# Patient Record
Sex: FEMALE | Race: WHITE | NOT HISPANIC OR LATINO | Employment: OTHER | URBAN - METROPOLITAN AREA
[De-identification: names, ages, dates, MRNs, and addresses within clinical notes are randomized per-mention and may not be internally consistent; named-entity substitution may affect disease eponyms.]

---

## 2019-11-02 ENCOUNTER — APPOINTMENT (OUTPATIENT)
Dept: GENERAL RADIOLOGY | Facility: HOSPITAL | Age: 84
End: 2019-11-02

## 2019-11-02 ENCOUNTER — HOSPITAL ENCOUNTER (OUTPATIENT)
Facility: HOSPITAL | Age: 84
Setting detail: OBSERVATION
Discharge: HOME | End: 2019-11-04
Attending: EMERGENCY MEDICINE | Admitting: INTERNAL MEDICINE

## 2019-11-02 DIAGNOSIS — R53.1 WEAKNESS: ICD-10-CM

## 2019-11-02 DIAGNOSIS — I48.91 ATRIAL FIBRILLATION WITH RAPID VENTRICULAR RESPONSE (HCC): Primary | ICD-10-CM

## 2019-11-02 LAB
ANION GAP SERPL CALCULATED.3IONS-SCNC: 15 MMOL/L (ref 5–15)
APTT PPP: 25.3 SECONDS (ref 24–31)
BASOPHILS # BLD AUTO: 0.1 10*3/MM3 (ref 0–0.2)
BASOPHILS NFR BLD AUTO: 0.9 % (ref 0–1.5)
BUN BLD-MCNC: 28 MG/DL (ref 8–23)
BUN/CREAT SERPL: 20 (ref 7–25)
CALCIUM SPEC-SCNC: 10 MG/DL (ref 8.6–10.5)
CHLORIDE SERPL-SCNC: 97 MMOL/L (ref 98–107)
CO2 SERPL-SCNC: 27 MMOL/L (ref 22–29)
CREAT BLD-MCNC: 1.4 MG/DL (ref 0.57–1)
DEPRECATED RDW RBC AUTO: 41.6 FL (ref 37–54)
EOSINOPHIL # BLD AUTO: 0.2 10*3/MM3 (ref 0–0.4)
EOSINOPHIL NFR BLD AUTO: 1.9 % (ref 0.3–6.2)
ERYTHROCYTE [DISTWIDTH] IN BLOOD BY AUTOMATED COUNT: 12.8 % (ref 12.3–15.4)
GFR SERPL CREATININE-BSD FRML MDRD: 36 ML/MIN/1.73
GLUCOSE BLD-MCNC: 134 MG/DL (ref 65–99)
HCT VFR BLD AUTO: 42.4 % (ref 34–46.6)
HGB BLD-MCNC: 14.7 G/DL (ref 12–15.9)
INR PPP: 1.02 (ref 0.9–1.1)
LYMPHOCYTES # BLD AUTO: 2.5 10*3/MM3 (ref 0.7–3.1)
LYMPHOCYTES NFR BLD AUTO: 25.9 % (ref 19.6–45.3)
MCH RBC QN AUTO: 31.9 PG (ref 26.6–33)
MCHC RBC AUTO-ENTMCNC: 34.6 G/DL (ref 31.5–35.7)
MCV RBC AUTO: 92.2 FL (ref 79–97)
MONOCYTES # BLD AUTO: 0.6 10*3/MM3 (ref 0.1–0.9)
MONOCYTES NFR BLD AUTO: 6.3 % (ref 5–12)
NEUTROPHILS # BLD AUTO: 6.3 10*3/MM3 (ref 1.7–7)
NEUTROPHILS NFR BLD AUTO: 65 % (ref 42.7–76)
NRBC BLD AUTO-RTO: 0.1 /100 WBC (ref 0–0.2)
NT-PROBNP SERPL-MCNC: 1094 PG/ML (ref 5–1800)
NT-PROBNP SERPL-MCNC: 909.9 PG/ML (ref 5–1800)
PLATELET # BLD AUTO: 284 10*3/MM3 (ref 140–450)
PMV BLD AUTO: 7.8 FL (ref 6–12)
POTASSIUM BLD-SCNC: 4.2 MMOL/L (ref 3.5–5.2)
PROTHROMBIN TIME: 10.7 SECONDS (ref 9.6–11.7)
RBC # BLD AUTO: 4.6 10*6/MM3 (ref 3.77–5.28)
SODIUM BLD-SCNC: 139 MMOL/L (ref 136–145)
TROPONIN T SERPL-MCNC: <0.01 NG/ML (ref 0–0.03)
TROPONIN T SERPL-MCNC: <0.01 NG/ML (ref 0–0.03)
WBC NRBC COR # BLD: 9.7 10*3/MM3 (ref 3.4–10.8)

## 2019-11-02 PROCEDURE — 85025 COMPLETE CBC W/AUTO DIFF WBC: CPT | Performed by: EMERGENCY MEDICINE

## 2019-11-02 PROCEDURE — 83880 ASSAY OF NATRIURETIC PEPTIDE: CPT | Performed by: NURSE PRACTITIONER

## 2019-11-02 PROCEDURE — 80048 BASIC METABOLIC PNL TOTAL CA: CPT | Performed by: EMERGENCY MEDICINE

## 2019-11-02 PROCEDURE — 71045 X-RAY EXAM CHEST 1 VIEW: CPT

## 2019-11-02 PROCEDURE — 85610 PROTHROMBIN TIME: CPT | Performed by: EMERGENCY MEDICINE

## 2019-11-02 PROCEDURE — G0378 HOSPITAL OBSERVATION PER HR: HCPCS

## 2019-11-02 PROCEDURE — 99284 EMERGENCY DEPT VISIT MOD MDM: CPT

## 2019-11-02 PROCEDURE — 99219 PR INITIAL OBSERVATION CARE/DAY 50 MINUTES: CPT | Performed by: NURSE PRACTITIONER

## 2019-11-02 PROCEDURE — 96372 THER/PROPH/DIAG INJ SC/IM: CPT

## 2019-11-02 PROCEDURE — 96366 THER/PROPH/DIAG IV INF ADDON: CPT

## 2019-11-02 PROCEDURE — 93005 ELECTROCARDIOGRAM TRACING: CPT | Performed by: EMERGENCY MEDICINE

## 2019-11-02 PROCEDURE — 96365 THER/PROPH/DIAG IV INF INIT: CPT

## 2019-11-02 PROCEDURE — 84484 ASSAY OF TROPONIN QUANT: CPT | Performed by: EMERGENCY MEDICINE

## 2019-11-02 PROCEDURE — 25010000002 ENOXAPARIN PER 10 MG: Performed by: EMERGENCY MEDICINE

## 2019-11-02 PROCEDURE — 84484 ASSAY OF TROPONIN QUANT: CPT | Performed by: NURSE PRACTITIONER

## 2019-11-02 PROCEDURE — 83880 ASSAY OF NATRIURETIC PEPTIDE: CPT | Performed by: EMERGENCY MEDICINE

## 2019-11-02 PROCEDURE — 85730 THROMBOPLASTIN TIME PARTIAL: CPT | Performed by: EMERGENCY MEDICINE

## 2019-11-02 RX ORDER — ACETAMINOPHEN 325 MG/1
650 TABLET ORAL 2 TIMES DAILY
Status: DISCONTINUED | OUTPATIENT
Start: 2019-11-02 | End: 2019-11-04 | Stop reason: HOSPADM

## 2019-11-02 RX ORDER — LATANOPROST 50 UG/ML
1 SOLUTION/ DROPS OPHTHALMIC NIGHTLY
Status: DISCONTINUED | OUTPATIENT
Start: 2019-11-02 | End: 2019-11-04 | Stop reason: HOSPADM

## 2019-11-02 RX ORDER — MULTIPLE VITAMINS W/ MINERALS TAB 9MG-400MCG
1 TAB ORAL DAILY
COMMUNITY

## 2019-11-02 RX ORDER — HYDROMORPHONE HCL 110MG/55ML
1 PATIENT CONTROLLED ANALGESIA SYRINGE INTRAVENOUS ONCE
Status: DISCONTINUED | OUTPATIENT
Start: 2019-11-02 | End: 2019-11-02

## 2019-11-02 RX ORDER — PRAVASTATIN SODIUM 40 MG
40 TABLET ORAL DAILY
COMMUNITY

## 2019-11-02 RX ORDER — CETIRIZINE HYDROCHLORIDE 10 MG/1
10 TABLET ORAL DAILY
Status: DISCONTINUED | OUTPATIENT
Start: 2019-11-03 | End: 2019-11-04 | Stop reason: HOSPADM

## 2019-11-02 RX ORDER — SODIUM CHLORIDE 0.9 % (FLUSH) 0.9 %
10 SYRINGE (ML) INJECTION EVERY 12 HOURS SCHEDULED
Status: DISCONTINUED | OUTPATIENT
Start: 2019-11-02 | End: 2019-11-04 | Stop reason: HOSPADM

## 2019-11-02 RX ORDER — AMLODIPINE BESYLATE 5 MG/1
10 TABLET ORAL DAILY
Status: DISCONTINUED | OUTPATIENT
Start: 2019-11-03 | End: 2019-11-04 | Stop reason: HOSPADM

## 2019-11-02 RX ORDER — CETIRIZINE HYDROCHLORIDE 10 MG/1
1 TABLET ORAL NIGHTLY
COMMUNITY

## 2019-11-02 RX ORDER — TRAVOPROST OPHTHALMIC SOLUTION 0.04 MG/ML
1 SOLUTION OPHTHALMIC EVERY EVENING
COMMUNITY
End: 2020-01-10

## 2019-11-02 RX ORDER — ASPIRIN 81 MG/1
324 TABLET, CHEWABLE ORAL ONCE
Status: COMPLETED | OUTPATIENT
Start: 2019-11-02 | End: 2019-11-02

## 2019-11-02 RX ORDER — HYDROCORTISONE ACETATE 0.5 %
1 CREAM (GRAM) TOPICAL 2 TIMES DAILY
COMMUNITY
End: 2020-08-13

## 2019-11-02 RX ORDER — DILTIAZEM HCL IN NACL,ISO-OSM 125 MG/125
5-15 PLASTIC BAG, INJECTION (ML) INTRAVENOUS CONTINUOUS
Status: DISCONTINUED | OUTPATIENT
Start: 2019-11-02 | End: 2019-11-03

## 2019-11-02 RX ORDER — CALCIUM CARBONATE 200(500)MG
2 TABLET,CHEWABLE ORAL 3 TIMES DAILY PRN
Status: DISCONTINUED | OUTPATIENT
Start: 2019-11-02 | End: 2019-11-04 | Stop reason: HOSPADM

## 2019-11-02 RX ORDER — ASPIRIN 81 MG/1
81 TABLET ORAL DAILY
Status: DISCONTINUED | OUTPATIENT
Start: 2019-11-03 | End: 2019-11-04 | Stop reason: HOSPADM

## 2019-11-02 RX ORDER — AMLODIPINE BESYLATE 10 MG/1
1 TABLET ORAL DAILY
COMMUNITY
Start: 2017-03-03 | End: 2019-11-04 | Stop reason: HOSPADM

## 2019-11-02 RX ORDER — SODIUM CHLORIDE 0.9 % (FLUSH) 0.9 %
10 SYRINGE (ML) INJECTION AS NEEDED
Status: DISCONTINUED | OUTPATIENT
Start: 2019-11-02 | End: 2019-11-04 | Stop reason: HOSPADM

## 2019-11-02 RX ORDER — FAMOTIDINE 20 MG/1
20 TABLET, FILM COATED ORAL
Status: DISCONTINUED | OUTPATIENT
Start: 2019-11-03 | End: 2019-11-04 | Stop reason: HOSPADM

## 2019-11-02 RX ORDER — LISINOPRIL AND HYDROCHLOROTHIAZIDE 20; 12.5 MG/1; MG/1
2 TABLET ORAL DAILY
COMMUNITY

## 2019-11-02 RX ORDER — RANITIDINE 150 MG/1
150 TABLET ORAL 2 TIMES DAILY
COMMUNITY
End: 2020-08-13

## 2019-11-02 RX ORDER — ACETAMINOPHEN 325 MG/1
650 TABLET ORAL 2 TIMES DAILY
COMMUNITY

## 2019-11-02 RX ORDER — ATORVASTATIN CALCIUM 10 MG/1
10 TABLET, FILM COATED ORAL DAILY
Status: DISCONTINUED | OUTPATIENT
Start: 2019-11-03 | End: 2019-11-04 | Stop reason: HOSPADM

## 2019-11-02 RX ORDER — FUROSEMIDE 10 MG/ML
20 INJECTION INTRAMUSCULAR; INTRAVENOUS ONCE
Status: COMPLETED | OUTPATIENT
Start: 2019-11-02 | End: 2019-11-03

## 2019-11-02 RX ORDER — MULTIVITAMIN,THERAPEUTIC
1 TABLET ORAL DAILY
Status: DISCONTINUED | OUTPATIENT
Start: 2019-11-03 | End: 2019-11-04 | Stop reason: HOSPADM

## 2019-11-02 RX ADMIN — CALCIUM CARBONATE (ANTACID) CHEW TAB 500 MG 2 TABLET: 500 CHEW TAB at 20:27

## 2019-11-02 RX ADMIN — DILTIAZEM HYDROCHLORIDE 5 MG/HR: 5 INJECTION INTRAVENOUS at 16:47

## 2019-11-02 RX ADMIN — SODIUM CHLORIDE, PRESERVATIVE FREE 10 ML: 5 INJECTION INTRAVENOUS at 20:26

## 2019-11-02 RX ADMIN — LATANOPROST 1 DROP: 50 SOLUTION/ DROPS OPHTHALMIC at 22:29

## 2019-11-02 RX ADMIN — OYSTER SHELL CALCIUM WITH VITAMIN D 1 TABLET: 500; 200 TABLET, FILM COATED ORAL at 20:26

## 2019-11-02 RX ADMIN — ASPIRIN 81 MG 324 MG: 81 TABLET ORAL at 20:26

## 2019-11-02 RX ADMIN — ENOXAPARIN SODIUM 70 MG: 80 INJECTION SUBCUTANEOUS at 20:26

## 2019-11-02 RX ADMIN — METOPROLOL TARTRATE 25 MG: 25 TABLET ORAL at 20:27

## 2019-11-02 NOTE — ED PROVIDER NOTES
"Subjective   History of Present Illness  Weakness  84-year-old female complains of general weakness since this morning.  She denies chest or abdominal pain or vomiting.  States she did have some nausea.  Daughter states that she describes some near syncope earlier in the day.  She denies focal numbness or weakness or headache or blurry vision or speech difficulties.  Review of Systems   HENT: Negative.    Eyes: Negative.    Respiratory: Negative.    Cardiovascular: Negative.    Gastrointestinal: Positive for nausea. Negative for abdominal pain.   Endocrine: Negative.    Genitourinary: Negative.    Musculoskeletal: Negative.    Skin: Negative.    Neurological: Positive for weakness. Negative for headaches.   Hematological: Negative.    Psychiatric/Behavioral: Negative.        No past medical history on file.  Hypercholesterolemia, hypertension  No Known Allergies    No past surgical history on file.    No family history on file.    Social History     Socioeconomic History   • Marital status:      Spouse name: Not on file   • Number of children: Not on file   • Years of education: Not on file   • Highest education level: Not on file           Objective   Physical Exam    /64   Pulse 95   Temp 98.2 °F (36.8 °C)   Resp 18   Ht 160 cm (63\")   Wt 72 kg (158 lb 11.7 oz)   SpO2 90%   BMI 28.12 kg/m²     General: Well-developed elderly female, no acute distress, alert and appropriate  Eyes: Pupils round and reactive, sclera nonicteric  HEENT: Mucous membranes moist, no mucosal swelling  Neck: Supple, no nuchal rigidity, no lymphadenopathy  Respirations: Respirations nonlabored, equal breath sounds bilaterally, clear lungs  Heart irregular rhythm, rapid rate, no murmurs rubs or gallops,   Abdomen soft nontender nondistended, no hepatosplenomegaly, no hernia, no mass, normal bowel sounds, no CVA tenderness  Extremities no clubbing cyanosis or edema, calves are symmetric and nontender  Neuro cranial nerves " grossly intact, no focal limb deficits  Psych oriented, pleasant affect  Skin no rash, brisk cap refill  Procedures           ED Course      EKG shows atrial fibrillation, rate of 115    Results for orders placed or performed during the hospital encounter of 11/02/19   Basic Metabolic Panel   Result Value Ref Range    Glucose 134 (H) 65 - 99 mg/dL    BUN 28 (H) 8 - 23 mg/dL    Creatinine 1.40 (H) 0.57 - 1.00 mg/dL    Sodium 139 136 - 145 mmol/L    Potassium 4.2 3.5 - 5.2 mmol/L    Chloride 97 (L) 98 - 107 mmol/L    CO2 27.0 22.0 - 29.0 mmol/L    Calcium 10.0 8.6 - 10.5 mg/dL    eGFR Non African Amer 36 (L) >60 mL/min/1.73    BUN/Creatinine Ratio 20.0 7.0 - 25.0    Anion Gap 15.0 5.0 - 15.0 mmol/L   Protime-INR   Result Value Ref Range    Protime 10.7 9.6 - 11.7 Seconds    INR 1.02 0.90 - 1.10   aPTT   Result Value Ref Range    PTT 25.3 24.0 - 31.0 seconds   BNP   Result Value Ref Range    proBNP 909.9 5.0-1,800.0 pg/mL   Troponin   Result Value Ref Range    Troponin T <0.010 0.000 - 0.030 ng/mL   CBC Auto Differential   Result Value Ref Range    WBC 9.70 3.40 - 10.80 10*3/mm3    RBC 4.60 3.77 - 5.28 10*6/mm3    Hemoglobin 14.7 12.0 - 15.9 g/dL    Hematocrit 42.4 34.0 - 46.6 %    MCV 92.2 79.0 - 97.0 fL    MCH 31.9 26.6 - 33.0 pg    MCHC 34.6 31.5 - 35.7 g/dL    RDW 12.8 12.3 - 15.4 %    RDW-SD 41.6 37.0 - 54.0 fl    MPV 7.8 6.0 - 12.0 fL    Platelets 284 140 - 450 10*3/mm3    Neutrophil % 65.0 42.7 - 76.0 %    Lymphocyte % 25.9 19.6 - 45.3 %    Monocyte % 6.3 5.0 - 12.0 %    Eosinophil % 1.9 0.3 - 6.2 %    Basophil % 0.9 0.0 - 1.5 %    Neutrophils, Absolute 6.30 1.70 - 7.00 10*3/mm3    Lymphocytes, Absolute 2.50 0.70 - 3.10 10*3/mm3    Monocytes, Absolute 0.60 0.10 - 0.90 10*3/mm3    Eosinophils, Absolute 0.20 0.00 - 0.40 10*3/mm3    Basophils, Absolute 0.10 0.00 - 0.20 10*3/mm3    nRBC 0.1 0.0 - 0.2 /100 WBC     Xr Chest 1 View    Result Date: 11/2/2019  No acute cardiopulmonary process.  Electronically Signed  By-Felicitas Delacruz On:11/2/2019 5:32 PM This report was finalized on [id number] by  Felicitas Delacruz, .            MDM  Patient presents with general weakness and was found to have atrial fibrillation with rapid ventricular response.  She reports no chest pain.  She is afebrile.  She is in no respiratory distress.  She was started on Cardizem bolus and drip titrated to effect and her heart rate stabilized.  She was given aspirin and a dose of Lovenox.  Hospitalist service was paged and cardiology consulted.  Final diagnoses:   Atrial fibrillation with rapid ventricular response (CMS/HCC)   Weakness              Daniel Barron MD  11/02/19 9599

## 2019-11-03 LAB
BACTERIA UR QL AUTO: ABNORMAL /HPF
BILIRUB UR QL STRIP: NEGATIVE
CLARITY UR: CLEAR
COLOR UR: YELLOW
GLUCOSE UR STRIP-MCNC: NEGATIVE MG/DL
HGB UR QL STRIP.AUTO: NEGATIVE
HYALINE CASTS UR QL AUTO: ABNORMAL /LPF
KETONES UR QL STRIP: NEGATIVE
LEUKOCYTE ESTERASE UR QL STRIP.AUTO: ABNORMAL
NITRITE UR QL STRIP: NEGATIVE
PH UR STRIP.AUTO: 6.5 [PH] (ref 5–8)
PROT UR QL STRIP: NEGATIVE
RBC # UR: ABNORMAL /HPF
REF LAB TEST METHOD: ABNORMAL
SP GR UR STRIP: 1.02 (ref 1–1.03)
SQUAMOUS #/AREA URNS HPF: ABNORMAL /HPF
TROPONIN T SERPL-MCNC: <0.01 NG/ML (ref 0–0.03)
UROBILINOGEN UR QL STRIP: ABNORMAL
WBC UR QL AUTO: ABNORMAL /HPF

## 2019-11-03 PROCEDURE — 93005 ELECTROCARDIOGRAM TRACING: CPT | Performed by: INTERNAL MEDICINE

## 2019-11-03 PROCEDURE — 96375 TX/PRO/DX INJ NEW DRUG ADDON: CPT

## 2019-11-03 PROCEDURE — 99204 OFFICE O/P NEW MOD 45 MIN: CPT | Performed by: INTERNAL MEDICINE

## 2019-11-03 PROCEDURE — 96366 THER/PROPH/DIAG IV INF ADDON: CPT

## 2019-11-03 PROCEDURE — 99226 PR SBSQ OBSERVATION CARE/DAY 35 MINUTES: CPT | Performed by: HOSPITALIST

## 2019-11-03 PROCEDURE — G0378 HOSPITAL OBSERVATION PER HR: HCPCS

## 2019-11-03 PROCEDURE — 84484 ASSAY OF TROPONIN QUANT: CPT | Performed by: NURSE PRACTITIONER

## 2019-11-03 PROCEDURE — 81001 URINALYSIS AUTO W/SCOPE: CPT | Performed by: EMERGENCY MEDICINE

## 2019-11-03 PROCEDURE — 87086 URINE CULTURE/COLONY COUNT: CPT | Performed by: EMERGENCY MEDICINE

## 2019-11-03 PROCEDURE — 96361 HYDRATE IV INFUSION ADD-ON: CPT

## 2019-11-03 PROCEDURE — 25010000002 FUROSEMIDE PER 20 MG: Performed by: NURSE PRACTITIONER

## 2019-11-03 PROCEDURE — 93010 ELECTROCARDIOGRAM REPORT: CPT | Performed by: INTERNAL MEDICINE

## 2019-11-03 RX ORDER — DILTIAZEM HYDROCHLORIDE 240 MG/1
240 CAPSULE, COATED, EXTENDED RELEASE ORAL
Status: DISCONTINUED | OUTPATIENT
Start: 2019-11-03 | End: 2019-11-04 | Stop reason: HOSPADM

## 2019-11-03 RX ORDER — SODIUM CHLORIDE 9 MG/ML
100 INJECTION, SOLUTION INTRAVENOUS CONTINUOUS
Status: DISCONTINUED | OUTPATIENT
Start: 2019-11-03 | End: 2019-11-04 | Stop reason: HOSPADM

## 2019-11-03 RX ADMIN — OYSTER SHELL CALCIUM WITH VITAMIN D 1 TABLET: 500; 200 TABLET, FILM COATED ORAL at 21:08

## 2019-11-03 RX ADMIN — FAMOTIDINE 20 MG: 20 TABLET ORAL at 16:06

## 2019-11-03 RX ADMIN — ATORVASTATIN CALCIUM 10 MG: 10 TABLET, FILM COATED ORAL at 09:23

## 2019-11-03 RX ADMIN — AMLODIPINE BESYLATE 10 MG: 5 TABLET ORAL at 09:23

## 2019-11-03 RX ADMIN — APIXABAN 2.5 MG: 2.5 TABLET, FILM COATED ORAL at 21:08

## 2019-11-03 RX ADMIN — METOPROLOL TARTRATE 25 MG: 25 TABLET ORAL at 21:08

## 2019-11-03 RX ADMIN — LISINOPRIL: 20 TABLET ORAL at 11:06

## 2019-11-03 RX ADMIN — FUROSEMIDE 20 MG: 10 INJECTION, SOLUTION INTRAMUSCULAR; INTRAVENOUS at 00:16

## 2019-11-03 RX ADMIN — ASPIRIN 81 MG: 81 TABLET, COATED ORAL at 09:22

## 2019-11-03 RX ADMIN — DILTIAZEM HYDROCHLORIDE 240 MG: 240 CAPSULE, COATED, EXTENDED RELEASE ORAL at 14:41

## 2019-11-03 RX ADMIN — FAMOTIDINE 20 MG: 20 TABLET ORAL at 09:23

## 2019-11-03 RX ADMIN — LATANOPROST 1 DROP: 50 SOLUTION/ DROPS OPHTHALMIC at 21:08

## 2019-11-03 RX ADMIN — LISINOPRIL: 20 TABLET ORAL at 11:07

## 2019-11-03 RX ADMIN — OYSTER SHELL CALCIUM WITH VITAMIN D 1 TABLET: 500; 200 TABLET, FILM COATED ORAL at 09:22

## 2019-11-03 RX ADMIN — SODIUM CHLORIDE, PRESERVATIVE FREE 10 ML: 5 INJECTION INTRAVENOUS at 21:08

## 2019-11-03 RX ADMIN — METOPROLOL TARTRATE 25 MG: 25 TABLET ORAL at 09:23

## 2019-11-03 RX ADMIN — ACETAMINOPHEN 650 MG: 325 TABLET ORAL at 09:24

## 2019-11-03 RX ADMIN — SODIUM CHLORIDE 100 ML/HR: 900 INJECTION, SOLUTION INTRAVENOUS at 14:34

## 2019-11-03 RX ADMIN — THERA TABS 1 TABLET: TAB at 09:23

## 2019-11-03 RX ADMIN — CETIRIZINE HYDROCHLORIDE 10 MG: 10 TABLET, FILM COATED ORAL at 09:23

## 2019-11-03 RX ADMIN — SODIUM CHLORIDE, PRESERVATIVE FREE 10 ML: 5 INJECTION INTRAVENOUS at 09:24

## 2019-11-03 RX ADMIN — METOPROLOL TARTRATE 25 MG: 25 TABLET ORAL at 17:22

## 2019-11-03 NOTE — NURSING NOTE
Spoke with Dr York concerning patient complaint of chest tightness and difficulty breathing; orders received

## 2019-11-03 NOTE — PLAN OF CARE
Problem: Patient Care Overview  Goal: Plan of Care Review  Outcome: Ongoing (interventions implemented as appropriate)      Problem: Fall Risk (Adult)  Goal: Identify Related Risk Factors and Signs and Symptoms  Outcome: Ongoing (interventions implemented as appropriate)    Goal: Absence of Fall  Outcome: Ongoing (interventions implemented as appropriate)      Problem: Arrhythmia/Dysrhythmia (Symptomatic) (Adult)  Goal: Signs and Symptoms of Listed Potential Problems Will be Absent, Minimized or Managed (Arrhythmia/Dysrhythmia)  Outcome: Ongoing (interventions implemented as appropriate)

## 2019-11-03 NOTE — CONSULTS
[facility]    Rikki Woodward MD    CHIEF COMPLAINT: Atrial fibrillation    HISTORY OF PRESENT ILLNESS:    [place]ia is a very pleasant 84-year-old female patient with no known cardiac disease does have a past medical history significant for gastroesophageal reflux disease, with coronary artery risk factor significant for dyslipidemia and hypertension.  Is very active elderly female who gardens and does basically anything that she wants.  She is limited by nothing as per her family and her report.    Yesterday she began to feel little bit lightheaded and fatigued and felt like her heart was racing somewhat.  This culminated in a very severe malaise with a clammy feeling that caused her to come to the ER.  In the ER she was found to be in atrial fibrillation with a tachycardic response albeit within the and 15 to 120 b/min range.  She was having palpitations in the setting reportedly.    We were consulted for new onset atrial fibrillation.  She does take metoprolol tartrate 25 mg p.o. twice daily.  She was started on a Cardizem drip in the hospital with Lovenox for anticoagulation.  She is done well and has felt significantly better since.  She denies any nausea lightheadedness or dizziness.  She does report prior to admission that she has had episodes of postprandial chest pain that lasted approximately 30 minutes.  There is no associated nausea vomiting diaphoresis.    I personally reviewed her ECG tracing from admission atrial fibrillation with an average ventricular response of about 115 bpm with slight asymmetric ST-T wave depressions in the lateral precordial leads.  Cardiac enzymes were cycled and were determined to be negative on 3 occasions.  I also personally reviewed her chest x-ray from yesterday which showed no active or acute cardiopulmonary process.  She does have acute kidney injury with creatinine clearance of 36 and creatinine of 1.40 mg/dL (baseline  unknown).  In the setting of the above she did have an elevated proBNP from 1094 trending down to 909.  Again this is in the setting of acute kidney injury.      Past Medical History:   Diagnosis Date   • GERD (gastroesophageal reflux disease)    • Glaucoma    • Hypertension    • Osteoarthritis      History reviewed. No pertinent surgical history.  History reviewed. No pertinent family history.  Social History     Tobacco Use   • Smoking status: Never Smoker   • Smokeless tobacco: Never Used   Substance Use Topics   • Alcohol use: No     Frequency: Never   • Drug use: No     Medications Prior to Admission   Medication Sig Dispense Refill Last Dose   • acetaminophen (TYLENOL) 325 MG tablet Take 650 mg by mouth 2 (Two) Times a Day.   11/2/2019 at Unknown time   • amLODIPine (NORVASC) 10 MG tablet Take 1 tablet by mouth Daily.   11/2/2019 at Unknown time   • aspirin 81 MG tablet Take 1 tablet by mouth Daily.   11/2/2019 at Unknown time   • Calcium Carbonate-Vitamin D 600-200 MG-UNIT tablet Take 1 tablet by mouth 2 (Two) Times a Day.   11/2/2019 at Unknown time   • cetirizine (ZYRTEC ALLERGY) 10 MG tablet Take 1 tablet by mouth Daily.   11/2/2019 at Unknown time   • Glucosamine-Chondroit-Vit C-Mn (GLUCOSAMINE 1500 COMPLEX) capsule Take 1 capsule by mouth 2 (Two) Times a Day.   11/2/2019 at Unknown time   • Lifitegrast (XIIDRA) 5 % solution Apply 1 drop to eye(s) as directed by provider 2 (Two) Times a Day.   11/2/2019 at Unknown time   • lisinopril-hydrochlorothiazide (PRINZIDE,ZESTORETIC) 20-12.5 MG per tablet Take 2 tablets by mouth Daily.   11/2/2019 at Unknown time   • metoprolol tartrate (LOPRESSOR) 25 MG tablet Take 25 mg by mouth 2 (Two) Times a Day.   11/2/2019 at Unknown time   • Multiple Vitamins-Minerals (MULTIVITAMIN WITH MINERALS) tablet tablet Take 1 tablet by mouth Daily.   11/2/2019 at Unknown time   • pravastatin (PRAVACHOL) 40 MG tablet Take 40 mg by mouth Daily.   11/2/2019 at Unknown time   •  "raNITIdine (ZANTAC) 150 MG tablet Take 150 mg by mouth 2 (Two) Times a Day.   11/2/2019 at Unknown time   • travoprost, AV free, (TRAVATAN) 0.004 % solution ophthalmic solution Administer 1 drop to both eyes Every Evening. in affected eye(s)   11/1/2019 at Unknown time     Allergies:  Patient has no known allergies.    Review of Symptoms:  Constitutional: Patient afebrile no chills or unexpected weight changes  Respiratory: No cough, no wheezing or dyspnea  Cardiovascular: No chest pain, palpitations, dyspnea, orthopnea and no edema  Gastrointestinal: No nausea, vomiting, constipation or diarrhea.  No melena or dark stools    All other systems reviewed and are negative      Vital Signs  Temp:  [97.4 °F (36.3 °C)-98.2 °F (36.8 °C)] 97.4 °F (36.3 °C)  Heart Rate:  [] 74  Resp:  [14-18] 16  BP: ()/(41-89) 110/60  Oxygen Therapy  SpO2: 92 %  Pulse Oximetry Type: Continuous  Device (Oxygen Therapy): room air}  Body mass index is 27.1 kg/m².  Flowsheet Rows      First Filed Value   Admission Height  160 cm (63\") Documented at 11/02/2019 1614   Admission Weight  72 kg (158 lb 11.7 oz) Documented at 11/02/2019 1614        Physical exam  Constitutional: well-nourished, and appears stated age in no acute distress  PERRL: Conjunctiva clear, no pallor, anicteric  HENMT: normocephalic, normal dentition, no cyanosis or pallor  Neck:no bruits, or thrills and bilateral normal carotid upstroke. Normal jugular venous pressure  Cardiovascular: No parasternal heaves an non-displaced focal PMI.  Normal rate with irregularly irregular rhythm: no rub, gallop, murmur or click and normal S1 and S2; no lower or upper extremity edema.   Lungs: unlabored, no wheezing with no rales or rhonchi on auscultation.  Extremities: Warm, no clubbing, cyanosis. Full and equal peripheral pulses in extremities with no bruits appreciated.   Abdomen: soft, non-tender, non-distended  Musculoskeletal: no joint tenderness or swelling and no " erythema  Skin: Warm and dry, non-erythematous   Neuro:alert and normal affect. Oriented to time, place and person.          Results Review:    I reviewed the patient's new clinical results.  Lab Results (most recent)     Procedure Component Value Units Date/Time    Troponin [[id number]]  (Normal) Collected:  11/03/19 0138    Specimen:  Blood Updated:  11/03/19 0315     Troponin T <0.010 ng/mL     Narrative:       Troponin T Reference Range:  <= 0.03 ng/mL-   Negative for AMI  >0.03 ng/mL-     Abnormal for myocardial necrosis.  Clinicians would have to utilize clinical acumen, EKG, Troponin and serial changes to determine if it is an Acute Myocardial Infarction or myocardial injury due to an underlying chronic condition.     BNP [[id number]]  (Normal) Collected:  11/02/19 2017    Specimen:  Blood Updated:  11/02/19 2117     proBNP 1,094.0 pg/mL     Narrative:       Among patients with dyspnea, NT-proBNP is highly sensitive for the detection of acute congestive heart failure. In addition NT-proBNP of <300 pg/ml effectively rules out acute congestive heart failure with 99% negative predictive value.    Troponin [[id number]]  (Normal) Collected:  11/02/19 2017    Specimen:  Blood Updated:  11/02/19 2109     Troponin T <0.010 ng/mL      Comment: Specimen hemolyzed.  Results may be affected.       Narrative:       Troponin T Reference Range:  <= 0.03 ng/mL-   Negative for AMI  >0.03 ng/mL-     Abnormal for myocardial necrosis.  Clinicians would have to utilize clinical acumen, EKG, Troponin and serial changes to determine if it is an Acute Myocardial Infarction or myocardial injury due to an underlying chronic condition.     Basic Metabolic Panel [[id number]]  (Abnormal) Collected:  11/02/19 1642    Specimen:  Blood Updated:  11/02/19 1709     Glucose 134 mg/dL      BUN 28 mg/dL      Creatinine 1.40 mg/dL      Sodium 139 mmol/L      Potassium 4.2 mmol/L      Chloride 97 mmol/L      CO2 27.0 mmol/L      Calcium 10.0 mg/dL       eGFR Non African Amer 36 mL/min/1.73      BUN/Creatinine Ratio 20.0     Anion Gap 15.0 mmol/L     Narrative:       GFR Normal >60  Chronic Kidney Disease <60  Kidney Failure <15    BNP [[id number]]  (Normal) Collected:  11/02/19 1642    Specimen:  Blood Updated:  11/02/19 1707     proBNP 909.9 pg/mL     Narrative:       Among patients with dyspnea, NT-proBNP is highly sensitive for the detection of acute congestive heart failure. In addition NT-proBNP of <300 pg/ml effectively rules out acute congestive heart failure with 99% negative predictive value.    Protime-INR [[id number]]  (Normal) Collected:  11/02/19 1642    Specimen:  Blood Updated:  11/02/19 1702     Protime 10.7 Seconds      INR 1.02    aPTT [[id number]]  (Normal) Collected:  11/02/19 1642    Specimen:  Blood Updated:  11/02/19 1702     PTT 25.3 seconds     CBC & Differential [[id number]] Collected:  11/02/19 1642    Specimen:  Blood Updated:  11/02/19 1647    Narrative:       The following orders were created for panel order CBC & Differential.  Procedure                               Abnormality         Status                     ---------                               -----------         ------                     CBC Auto Differential[[id number]]        Normal              Final result                 Please view results for these tests on the individual orders.    CBC Auto Differential [[id number]]  (Normal) Collected:  11/02/19 1642    Specimen:  Blood Updated:  11/02/19 1647     WBC 9.70 10*3/mm3      RBC 4.60 10*6/mm3      Hemoglobin 14.7 g/dL      Hematocrit 42.4 %      MCV 92.2 fL      MCH 31.9 pg      MCHC 34.6 g/dL      RDW 12.8 %      RDW-SD 41.6 fl      MPV 7.8 fL      Platelets 284 10*3/mm3      Neutrophil % 65.0 %      Lymphocyte % 25.9 %      Monocyte % 6.3 %      Eosinophil % 1.9 %      Basophil % 0.9 %      Neutrophils, Absolute 6.30 10*3/mm3      Lymphocytes, Absolute 2.50 10*3/mm3      Monocytes, Absolute 0.60 10*3/mm3      Eosinophils,  Absolute 0.20 10*3/mm3      Basophils, Absolute 0.10 10*3/mm3      nRBC 0.1 /100 WBC           Imaging Results (Most Recent)     Procedure Component Value Units Date/Time    XR Chest 1 View [[id number]] Collected:  11/02/19 1732     Updated:  11/02/19 1743    Narrative:       Examination: XR CHEST 1 VW-     Date of Exam: 11/2/2019 5:23 PM     Indication: weakness.     Comparison: None available.     Technique: 1 view of the chest      Findings:  There are no airspace consolidations. No pleural effusions. No  pneumothorax. The heart size is normal. The pulmonary vasculature  appears within normal limits. No acute osseous abnormality identified.       Impression:       No acute cardiopulmonary process.     Electronically Signed By-Felicitas Delacruz On:11/2/2019 5:32 PM  This report was finalized on [id number] by  Felicitas Delacruz, .        reviewed    ECG/EMG Results (most recent)     Procedure Component Value Units Date/Time    ECG 12 Lead [[id number]] Collected:  11/02/19 1643     Updated:  11/03/19 0638    Narrative:       HEART RATE= 115  bpm  RR Interval= 522  ms  UT Interval=   ms  P Horizontal Axis=   deg  P Front Axis=   deg  QRSD Interval= 83  ms  QT Interval= 330  ms  QRS Axis= 62  deg  T Wave Axis= -29  deg  - ABNORMAL ECG -  Atrial fibrillation  No previous ECG available for comparison  Electronically Signed By: Daniel Barron ([facility]) 03-Nov-2019 06:37:49  Date and Time of Study: 2019-11-02 16:43:52        reviewed    Assessment/Plan   84-year-old female with new onset atrial fibrillation with somewhat rapid ventricular response in the low 120 bpm range.  She responded very well to dual AV leila agents with the addition of Cardizem drip.  She has no obvious etiology to the above, no active pulmonary disease no recent pulmonary or upper respiratory process, no known ischemic heart disease, no known structural heart disease, no audible murmurs suggesting valvular etiology on physical exam.  She does have long-standing  hypertension which may attribute to afterload induced elevated filling pressures and resultant atrial fibrillation.  She has no congestive heart failure on physical exam despite proBNP which is almost exclusively secondary to elevated filling pressures contributing to atrial fibrillation; this is also somewhat falsely elevated in the setting of acute kidney injury.    Atrial fibrillation:  -We will switch to p.o. Cardizem and continue dual AV leila agent in the form of Cardizem p.o. and metoprolol tartrate twice daily.  Will start NOAC as anticoagulation.  Tentative plan is to allow for at least 6 weeks of anticoagulation with dual AV leila agent to slow sign of ventricular conduction in hopes of spontaneous conversion.  -We will get an echocardiogram to rule out structural heart disease and valvular contribution to the above.    GERD (gastroesophageal reflux disease)   Glaucoma   Hypertension   Osteoarthritis   Acute Kidney Injury  -Initiate IV fluids as no evidence of acute congestive heart failure and unlikely that her echo show any significant structural heart disease.    I discussed the patients findings and my recommendations with patient and family.     Greater than 30 minutes total of face-to-face/floor  time was spent with the patient and family and nursing coordinating plan and patient management.  Of which counseling of patient with regard specifically to describing the etiologies and likely etiology for her for new onset atrial fibrillation and its treatment comprised 50% of this total time.        Ricky York MD  11/03/19  2:08 PM

## 2019-11-03 NOTE — NURSING NOTE
Hospitalist NP gave RN orders for a BNP in the AM while at bedside.  RN also told NP about patient and family complaints of patient's discoloration on her right thumb.

## 2019-11-03 NOTE — H&P
[facility] Hospital Medicine Services        Patient Name:  Marisol Snyder  YOB: 1935  MRN:  [id number]  Admit Date:  11/2/2019    Patient Care Team:  Rikki Woodward MD as PCP - General (Family Medicine)            History Present Illness     Chief Complaint   Patient presents with   • Weakness - Generalized       Ms. Snyder is a 84 y.o.  presents to [facility] complaining of general weakness since this morning. She denies dizziness, chest pain, dyspnea, fever, headache , visual problems of fever. Family at bedside report she almost passed out while shopping today. In ED she was foungd to be in atrial fibrillation with RVR . Cardizem bolus and drip were given with reduced rate. She denies any PMH of CAD,CHF or cardiac problems. She denies smoking. She reports a PMH of GERD , HTN, HLD glaucoma and osteoporosis . In ED troponin was 0.010 but BNP was 909. She has nor BLE edema and breath sounds are clear. Cr is 1.4.UA pending. Wbc normal , cxr no acute process and afebrile. She will be admitted for serial troponin, 1x dose IV Lasix given and she will be maintained on Cardizem drip as needed. Cardiology consulted. She also complains of a dusky appearance to her right thumb which she reports started today. It is warm and blanches and no tenderness or erythema noted.          History of Present Illness  Review of Systems   Constitution: Positive for diaphoresis and weakness.   HENT: Negative.    Eyes: Negative.    Cardiovascular: Negative.    Respiratory: Negative.    Endocrine: Negative.    Hematologic/Lymphatic: Negative.    Skin: Positive for color change.        Right thumb   Musculoskeletal: Negative.    Gastrointestinal: Negative.    Genitourinary: Negative.    Psychiatric/Behavioral: Negative.    Allergic/Immunologic: Negative.            Personal History     Past Medical History:   Diagnosis Date   • GERD (gastroesophageal reflux disease)    • Glaucoma    •  Hypertension    • Osteoarthritis      History reviewed. No pertinent surgical history.  History reviewed. No pertinent family history.  Social History     Tobacco Use   • Smoking status: Never Smoker   • Smokeless tobacco: Never Used   Substance Use Topics   • Alcohol use: No     Frequency: Never   • Drug use: No     Prior to Admission medications    Medication Sig Start Date End Date Taking? Authorizing Provider   acetaminophen (TYLENOL) 325 MG tablet Take 650 mg by mouth 2 (Two) Times a Day.   Yes Sola Saleh MD   amLODIPine (NORVASC) 10 MG tablet Take 1 tablet by mouth Daily. 3/3/17  Yes Sola Saleh MD   aspirin 81 MG tablet Take 1 tablet by mouth Daily.   Yes Sola Saleh MD   Calcium Carbonate-Vitamin D 600-200 MG-UNIT tablet Take 1 tablet by mouth 2 (Two) Times a Day.   Yes Sola Saleh MD   cetirizine (ZYRTEC ALLERGY) 10 MG tablet Take 1 tablet by mouth Daily.   Yes Sola Saleh MD   Glucosamine-Chondroit-Vit C-Mn (GLUCOSAMINE 1500 COMPLEX) capsule Take 1 capsule by mouth 2 (Two) Times a Day.   Yes Sola Saleh MD   Lifitegrast (XIIDRA) 5 % solution Apply 1 drop to eye(s) as directed by provider 2 (Two) Times a Day.   Yes Sola Saleh MD   lisinopril-hydrochlorothiazide (PRINZIDE,ZESTORETIC) 20-12.5 MG per tablet Take 2 tablets by mouth Daily.   Yes Sola Saleh MD   metoprolol tartrate (LOPRESSOR) 25 MG tablet Take 25 mg by mouth 2 (Two) Times a Day.   Yes Sola Saleh MD   Multiple Vitamins-Minerals (MULTIVITAMIN WITH MINERALS) tablet tablet Take 1 tablet by mouth Daily.   Yes Sola Saleh MD   pravastatin (PRAVACHOL) 40 MG tablet Take 40 mg by mouth Daily.   Yes Sola Saleh MD   raNITIdine (ZANTAC) 150 MG tablet Take 150 mg by mouth 2 (Two) Times a Day.   Yes Sola Saleh MD   travoprost, AV free, (TRAVATAN) 0.004 % solution ophthalmic solution Administer 1 drop to both eyes Every Evening. in  affected eye(s)   Yes ProviderSola MD   Hypromellose (GENTEAL MILD) 0.2 % solution   11/2/19  Sola Saleh MD   MULTIPLE VITAMINS-MINERALS ER PO Take  by mouth.  11/2/19  Sola Saleh MD   Pseudoephedrine-Acetaminophen  MG tablet Take 2 tablets by mouth Every 6 (Six) Hours.  11/2/19  Sola Saleh MD     Allergies:  No Known Allergies      Objective     Vital Signs  Temp:  [97.7 °F (36.5 °C)-98.2 °F (36.8 °C)] 97.7 °F (36.5 °C)  Heart Rate:  [] 106  Resp:  [16-18] 16  BP: (106-150)/(57-89) 127/70  SpO2:  [85 %-96 %] 85 %  on   ;   Device (Oxygen Therapy): room air  Body mass index is 27.69 kg/m².    Physical Exam   Constitutional: She is oriented to person, place, and time. She appears well-nourished.   HENT:   Head: Atraumatic.   Eyes: EOM are normal.   Neck: Normal range of motion. Neck supple.   Cardiovascular:   Atrial fib    Pulmonary/Chest: Effort normal and breath sounds normal.   Abdominal: Soft. Bowel sounds are normal.   Musculoskeletal: Normal range of motion.   Neurological: She is alert and oriented to person, place, and time.   Skin: Skin is warm and dry.   Right thumb slightly dusky, warm non tender    Psychiatric: She has a normal mood and affect. Her behavior is normal. Judgment and thought content normal.   Vitals reviewed.      Results Review:  I reviewed the patient's new clinical results.  I reviewed the patient's new imaging results and agree with the interpretation.  I reviewed the patient's other test results and agree with the interpretation  I personally viewed and interpreted the patient's EKG/Telemetry data  Discussed with ED provider.    Results from last 7 days   Lab Units 11/02/19  1642   WBC 10*3/mm3 9.70   HEMOGLOBIN g/dL 14.7   HEMATOCRIT % 42.4   PLATELETS 10*3/mm3 284     Results from last 7 days   Lab Units 11/02/19  1642   SODIUM mmol/L 139   POTASSIUM mmol/L 4.2   CHLORIDE mmol/L 97*   CO2 mmol/L 27.0   BUN mg/dL 28*   CREATININE  mg/dL 1.40*   GLUCOSE mg/dL 134*   CALCIUM mg/dL 10.0     Results from last 7 days   Lab Units 11/02/19  1642   SODIUM mmol/L 139   POTASSIUM mmol/L 4.2   CHLORIDE mmol/L 97*   CO2 mmol/L 27.0   BUN mg/dL 28*   CREATININE mg/dL 1.40*   CALCIUM mg/dL 10.0   GLUCOSE mg/dL 134*         Lab Results   Component Value Date    CALCIUM 10.0 11/02/2019     No results found for: HGBA1C  No results found for: CHOL, CHLPL, TRIG, HDL, LDL, LDLDIRECT  No results found for: LIPASE          Microbiology Results (last 10 days)     ** No results found for the last 240 hours. **          ECG/EMG Results (most recent)     Procedure Component Value Units Date/Time    ECG 12 Lead [[id number]] Collected:  11/02/19 1643     Updated:  11/02/19 1645    Narrative:       HEART RATE= 115  bpm  RR Interval= 522  ms  [place] Interval=   ms  P Horizontal Axis=   deg  P Front Axis=   deg  QRSD Interval= 83  ms  QT Interval= 330  ms  QRS Axis= 62  deg  T Wave Axis= -29  deg  - ABNORMAL ECG -  Atrial fibrillation  Electronically Signed By:   Date and Time of Study: 2019-11-02 16:43:52                    Xr Chest 1 View    Result Date: 11/2/2019  No acute cardiopulmonary process.  Electronically Signed By-Felicitas Delacruz On:11/2/2019 5:32 PM This report was finalized on [id number] by  Felicitas Delacruz, .        Estimated Creatinine Clearance: 28.2 mL/min (A) (by C-G formula based on SCr of 1.4 mg/dL (H)).      Assessment/Plan     Active Hospital Problems    Diagnosis POA   • Atrial fibrillation with rapid ventricular response (CMS/HCC) [I48.91] Yes       General weakness likely secondary to new onset atrial fib with RVR now rate better controlled on Cardizem, Lovenox - pharmacy to dose , serial troponin, Fall precautions cardiology consulted , 2 D echo in am     CHF ? New diagnosis -  no BLE edema no dyspnea breath sounds clear CXR negative - 1x 20 mg Lasix given in light of Cr - 2D echo and repeat BNP in am cardiology consulted     Acute renal injury Cr  1.4- no previous labs - no IVF due to BNP, monitor renal function try to avoid nephrotoxic drug although 1 x low dose Lasix given for BNP    HTN- controlled continue home Norvasc, Zestoretic, metoprolol     HLD- on statin     OA- on glucosamine chondrotin     GERD- on Zantac    Glaucoma - on home eye drops              · I discussed the patients findings and my recommendations with patient and family at bedside as well as patients RN.  ·     VTE Prophylaxis - Lovenox pharmacy to dose .      Code Status - Full code.       MERNA Morris  [facility] Hospitalist Team  11/02/19  10:25 PM

## 2019-11-03 NOTE — PLAN OF CARE
Problem: Patient Care Overview  Goal: Plan of Care Review  Outcome: Ongoing (interventions implemented as appropriate)    Goal: Individualization and Mutuality  Outcome: Ongoing (interventions implemented as appropriate)    Goal: Discharge Needs Assessment  Outcome: Ongoing (interventions implemented as appropriate)      Problem: Fall Risk (Adult)  Goal: Identify Related Risk Factors and Signs and Symptoms  Outcome: Ongoing (interventions implemented as appropriate)    Goal: Absence of Fall  Outcome: Ongoing (interventions implemented as appropriate)      Problem: Arrhythmia/Dysrhythmia (Symptomatic) (Adult)  Goal: Signs and Symptoms of Listed Potential Problems Will be Absent, Minimized or Managed (Arrhythmia/Dysrhythmia)  Outcome: Ongoing (interventions implemented as appropriate)

## 2019-11-04 ENCOUNTER — HOSPITAL ENCOUNTER (OUTPATIENT)
Dept: CARDIOLOGY | Facility: HOSPITAL | Age: 84
Setting detail: OBSERVATION
Discharge: HOME | End: 2019-11-04

## 2019-11-04 VITALS
OXYGEN SATURATION: 90 % | DIASTOLIC BLOOD PRESSURE: 54 MMHG | RESPIRATION RATE: 20 BRPM | BODY MASS INDEX: 27.11 KG/M2 | WEIGHT: 153 LBS | HEIGHT: 63 IN | HEART RATE: 76 BPM | TEMPERATURE: 97.4 F | SYSTOLIC BLOOD PRESSURE: 112 MMHG

## 2019-11-04 VITALS
DIASTOLIC BLOOD PRESSURE: 79 MMHG | HEART RATE: 112 BPM | HEIGHT: 63 IN | WEIGHT: 153 LBS | SYSTOLIC BLOOD PRESSURE: 130 MMHG | RESPIRATION RATE: 20 BRPM | BODY MASS INDEX: 27.11 KG/M2

## 2019-11-04 PROBLEM — I10 HYPERTENSION: Status: ACTIVE | Noted: 2019-11-04

## 2019-11-04 PROBLEM — K21.9 GERD (GASTROESOPHAGEAL REFLUX DISEASE): Status: ACTIVE | Noted: 2019-11-04

## 2019-11-04 LAB
ANION GAP SERPL CALCULATED.3IONS-SCNC: 8 MMOL/L (ref 5–15)
BACTERIA SPEC AEROBE CULT: ABNORMAL
BH CV ECHO MEAS - ACS: 1.7 CM
BH CV ECHO MEAS - AO MAX PG (FULL): 3.4 MMHG
BH CV ECHO MEAS - AO MAX PG: 8.4 MMHG
BH CV ECHO MEAS - AO MEAN PG (FULL): 3.3 MMHG
BH CV ECHO MEAS - AO MEAN PG: 5.6 MMHG
BH CV ECHO MEAS - AO ROOT AREA (BSA CORRECTED): 1.5
BH CV ECHO MEAS - AO ROOT AREA: 5.1 CM^2
BH CV ECHO MEAS - AO ROOT DIAM: 2.5 CM
BH CV ECHO MEAS - AO V2 MAX: 144.6 CM/SEC
BH CV ECHO MEAS - AO V2 MEAN: 116.1 CM/SEC
BH CV ECHO MEAS - AO V2 VTI: 28.9 CM
BH CV ECHO MEAS - ASC AORTA: 2.7 CM
BH CV ECHO MEAS - AVA(I,A): 2.2 CM^2
BH CV ECHO MEAS - AVA(I,D): 2.2 CM^2
BH CV ECHO MEAS - AVA(V,A): 2.2 CM^2
BH CV ECHO MEAS - AVA(V,D): 2.2 CM^2
BH CV ECHO MEAS - BSA(HAYCOCK): 1.8 M^2
BH CV ECHO MEAS - BSA: 1.7 M^2
BH CV ECHO MEAS - BZI_BMI: 26.9 KILOGRAMS/M^2
BH CV ECHO MEAS - BZI_METRIC_HEIGHT: 160 CM
BH CV ECHO MEAS - BZI_METRIC_WEIGHT: 68.9 KG
BH CV ECHO MEAS - EDV(CUBED): 40.3 ML
BH CV ECHO MEAS - EDV(MOD-SP2): 26.5 ML
BH CV ECHO MEAS - EDV(MOD-SP4): 30 ML
BH CV ECHO MEAS - EDV(TEICH): 48.4 ML
BH CV ECHO MEAS - EF(CUBED): 67.2 %
BH CV ECHO MEAS - EF(MOD-BP): 50 %
BH CV ECHO MEAS - EF(MOD-SP2): 52.4 %
BH CV ECHO MEAS - EF(MOD-SP4): 46.8 %
BH CV ECHO MEAS - EF(TEICH): 59.8 %
BH CV ECHO MEAS - ESV(CUBED): 13.2 ML
BH CV ECHO MEAS - ESV(MOD-SP2): 12.6 ML
BH CV ECHO MEAS - ESV(MOD-SP4): 15.9 ML
BH CV ECHO MEAS - ESV(TEICH): 19.5 ML
BH CV ECHO MEAS - FS: 31 %
BH CV ECHO MEAS - IVS/LVPW: 1
BH CV ECHO MEAS - IVSD: 1.3 CM
BH CV ECHO MEAS - LA DIMENSION(2D): 3.5 CM
BH CV ECHO MEAS - LV DIASTOLIC VOL/BSA (35-75): 17.4 ML/M^2
BH CV ECHO MEAS - LV MASS(C)D: 150.3 GRAMS
BH CV ECHO MEAS - LV MASS(C)DI: 87.3 GRAMS/M^2
BH CV ECHO MEAS - LV MAX PG: 5 MMHG
BH CV ECHO MEAS - LV MEAN PG: 2.3 MMHG
BH CV ECHO MEAS - LV SYSTOLIC VOL/BSA (12-30): 9.3 ML/M^2
BH CV ECHO MEAS - LV V1 MAX: 111.5 CM/SEC
BH CV ECHO MEAS - LV V1 MEAN: 70.2 CM/SEC
BH CV ECHO MEAS - LV V1 VTI: 22 CM
BH CV ECHO MEAS - LVIDD: 3.4 CM
BH CV ECHO MEAS - LVIDS: 2.4 CM
BH CV ECHO MEAS - LVOT AREA: 2.9 CM^2
BH CV ECHO MEAS - LVOT DIAM: 1.9 CM
BH CV ECHO MEAS - LVPWD: 1.3 CM
BH CV ECHO MEAS - MV MAX PG: 6.4 MMHG
BH CV ECHO MEAS - MV MEAN PG: 2.1 MMHG
BH CV ECHO MEAS - MV V2 MAX: 126.2 CM/SEC
BH CV ECHO MEAS - MV V2 MEAN: 62.3 CM/SEC
BH CV ECHO MEAS - MV V2 VTI: 23.3 CM
BH CV ECHO MEAS - MVA(VTI): 2.8 CM^2
BH CV ECHO MEAS - PA MAX PG: 4.3 MMHG
BH CV ECHO MEAS - PA MEAN PG: 2.3 MMHG
BH CV ECHO MEAS - PA V2 MAX: 104.2 CM/SEC
BH CV ECHO MEAS - PA V2 MEAN: 71.7 CM/SEC
BH CV ECHO MEAS - PA V2 VTI: 19.2 CM
BH CV ECHO MEAS - PI END-D VEL: 75 CM/SEC
BH CV ECHO MEAS - PI MAX PG: 9.5 MMHG
BH CV ECHO MEAS - PI MAX VEL: 154.4 CM/SEC
BH CV ECHO MEAS - RAP SYSTOLE: 3 MMHG
BH CV ECHO MEAS - RVDD: 3.6 CM
BH CV ECHO MEAS - RVOT AREA: 5 CM^2
BH CV ECHO MEAS - RVOT DIAM: 2.5 CM
BH CV ECHO MEAS - RVSP: 27.3 MMHG
BH CV ECHO MEAS - SI(AO): 85.7 ML/M^2
BH CV ECHO MEAS - SI(CUBED): 15.7 ML/M^2
BH CV ECHO MEAS - SI(LVOT): 37.3 ML/M^2
BH CV ECHO MEAS - SI(MOD-SP2): 8.1 ML/M^2
BH CV ECHO MEAS - SI(MOD-SP4): 8.1 ML/M^2
BH CV ECHO MEAS - SI(TEICH): 16.8 ML/M^2
BH CV ECHO MEAS - SV(AO): 147.5 ML
BH CV ECHO MEAS - SV(CUBED): 27.1 ML
BH CV ECHO MEAS - SV(LVOT): 64.2 ML
BH CV ECHO MEAS - SV(MOD-SP2): 13.9 ML
BH CV ECHO MEAS - SV(MOD-SP4): 14 ML
BH CV ECHO MEAS - SV(TEICH): 29 ML
BH CV ECHO MEAS - TR MAX VEL: 245.5 CM/SEC
BUN BLD-MCNC: 20 MG/DL (ref 8–23)
BUN/CREAT SERPL: 19 (ref 7–25)
CALCIUM SPEC-SCNC: 8.9 MG/DL (ref 8.6–10.5)
CHLORIDE SERPL-SCNC: 103 MMOL/L (ref 98–107)
CO2 SERPL-SCNC: 28 MMOL/L (ref 22–29)
CREAT BLD-MCNC: 1.05 MG/DL (ref 0.57–1)
DEPRECATED RDW RBC AUTO: 42 FL (ref 37–54)
ERYTHROCYTE [DISTWIDTH] IN BLOOD BY AUTOMATED COUNT: 13 % (ref 12.3–15.4)
GFR SERPL CREATININE-BSD FRML MDRD: 50 ML/MIN/1.73
GLUCOSE BLD-MCNC: 101 MG/DL (ref 65–99)
HCT VFR BLD AUTO: 36.7 % (ref 34–46.6)
HGB BLD-MCNC: 12.9 G/DL (ref 12–15.9)
MAGNESIUM SERPL-MCNC: 1.6 MG/DL (ref 1.6–2.4)
MCH RBC QN AUTO: 33 PG (ref 26.6–33)
MCHC RBC AUTO-ENTMCNC: 35.2 G/DL (ref 31.5–35.7)
MCV RBC AUTO: 93.8 FL (ref 79–97)
PLATELET # BLD AUTO: 198 10*3/MM3 (ref 140–450)
PMV BLD AUTO: 7.6 FL (ref 6–12)
POTASSIUM BLD-SCNC: 3.5 MMOL/L (ref 3.5–5.2)
RBC # BLD AUTO: 3.91 10*6/MM3 (ref 3.77–5.28)
SODIUM BLD-SCNC: 139 MMOL/L (ref 136–145)
WBC NRBC COR # BLD: 8.2 10*3/MM3 (ref 3.4–10.8)

## 2019-11-04 PROCEDURE — 93306 TTE W/DOPPLER COMPLETE: CPT | Performed by: INTERNAL MEDICINE

## 2019-11-04 PROCEDURE — 80048 BASIC METABOLIC PNL TOTAL CA: CPT | Performed by: HOSPITALIST

## 2019-11-04 PROCEDURE — 96361 HYDRATE IV INFUSION ADD-ON: CPT

## 2019-11-04 PROCEDURE — 99217 PR OBSERVATION CARE DISCHARGE MANAGEMENT: CPT | Performed by: INTERNAL MEDICINE

## 2019-11-04 PROCEDURE — 83735 ASSAY OF MAGNESIUM: CPT | Performed by: NURSE PRACTITIONER

## 2019-11-04 PROCEDURE — 85027 COMPLETE CBC AUTOMATED: CPT | Performed by: HOSPITALIST

## 2019-11-04 PROCEDURE — 93306 TTE W/DOPPLER COMPLETE: CPT

## 2019-11-04 PROCEDURE — 99213 OFFICE O/P EST LOW 20 MIN: CPT | Performed by: NURSE PRACTITIONER

## 2019-11-04 PROCEDURE — 25010000002 SULFUR HEXAFLUORIDE MICROSPH 60.7-25 MG RECONSTITUTED SUSPENSION: Performed by: INTERNAL MEDICINE

## 2019-11-04 PROCEDURE — G0378 HOSPITAL OBSERVATION PER HR: HCPCS

## 2019-11-04 RX ORDER — DILTIAZEM HYDROCHLORIDE 240 MG/1
240 CAPSULE, COATED, EXTENDED RELEASE ORAL
Qty: 30 CAPSULE | Refills: 0 | Status: SHIPPED | OUTPATIENT
Start: 2019-11-05

## 2019-11-04 RX ADMIN — ATORVASTATIN CALCIUM 10 MG: 10 TABLET, FILM COATED ORAL at 09:26

## 2019-11-04 RX ADMIN — FAMOTIDINE 20 MG: 20 TABLET ORAL at 09:26

## 2019-11-04 RX ADMIN — THERA TABS 1 TABLET: TAB at 09:27

## 2019-11-04 RX ADMIN — ASPIRIN 81 MG: 81 TABLET, COATED ORAL at 09:27

## 2019-11-04 RX ADMIN — DILTIAZEM HYDROCHLORIDE 240 MG: 240 CAPSULE, COATED, EXTENDED RELEASE ORAL at 09:27

## 2019-11-04 RX ADMIN — METOPROLOL TARTRATE 25 MG: 25 TABLET ORAL at 09:27

## 2019-11-04 RX ADMIN — AMLODIPINE BESYLATE 10 MG: 5 TABLET ORAL at 10:51

## 2019-11-04 RX ADMIN — CETIRIZINE HYDROCHLORIDE 10 MG: 10 TABLET, FILM COATED ORAL at 09:27

## 2019-11-04 RX ADMIN — LISINOPRIL: 20 TABLET ORAL at 10:51

## 2019-11-04 RX ADMIN — OYSTER SHELL CALCIUM WITH VITAMIN D 1 TABLET: 500; 200 TABLET, FILM COATED ORAL at 09:26

## 2019-11-04 RX ADMIN — SULFUR HEXAFLUORIDE 2 ML: KIT at 11:20

## 2019-11-04 RX ADMIN — APIXABAN 2.5 MG: 2.5 TABLET, FILM COATED ORAL at 09:27

## 2019-11-04 RX ADMIN — SODIUM CHLORIDE 100 ML/HR: 900 INJECTION, SOLUTION INTRAVENOUS at 11:57

## 2019-11-04 NOTE — DISCHARGE SUMMARY
Date of Admission: 11/2/2019    Date of Discharge:  11/4/2019    Length of stay:  LOS: 0 days     Admission Diagnosis:A fib RVR    Principal and Active Diagnosis During Hospital Stay:  New onset atrial fib with RVR  -rate controlled with Cardizem and metoprolol po  -Patient has been started on Eliquis for stroke prevention  -Echo to evaluate for structural heart disease pending and will be followed up on in cardiology office     General weakness likely secondary to above   -MI ruled out by serial troponins  -resolved      Acute renal injury  secondary to ACE inhibitor versus CKD secondary to hypertension  -resolved      Primary HTN, chronic and controlled   -continue home Norvasc, Zestoretic, metoprolol      HLD- on statin      OA- on glucosamine chondrotin      GERD- on Zantac     Glaucoma - on home eye drops          Hospital Course  Patient is a 84 y.o. female presented with patient's was found to have new onset atrial fibrillation with RVR.  Was controlled on Cardizem drip and then transition to oral Cardizem and beta-blocker.  Eliquis was started by cardiology.  Cardiology felt the patient could be discharged home with outpatient follow-up and they will evaluate in 6 weeks for possible cardioversion at that time.  Echo was done and will be followed up in the cardiologist office and she was felt stable for discharge.      Procedures Performed:as noted         Consults:   Consults     Date and Time Order Name Status Description    11/2/2019 0446 Cardiology (on-call MD unless specified)      11/2/2019 9517 Hospitalist (on-call MD unless specified) Completed           Pertinent Test Results:     Lab Results (last 72 hours)     Procedure Component Value Units Date/Time    Urine Culture - Urine, Urine, Clean Catch [[id number]]  (Normal) Collected:  11/03/19 2230    Specimen:  Urine, Clean Catch Updated:  11/04/19 1110     Urine Culture Growth present, too young to evaluate    Magnesium [[id number]]  (Normal)  Collected:  11/04/19 0318    Specimen:  Blood Updated:  11/04/19 0934     Magnesium 1.6 mg/dL     Basic Metabolic Panel [[id number]]  (Abnormal) Collected:  11/04/19 0318    Specimen:  Blood Updated:  11/04/19 0429     Glucose 101 mg/dL      BUN 20 mg/dL      Creatinine 1.05 mg/dL      Sodium 139 mmol/L      Potassium 3.5 mmol/L      Chloride 103 mmol/L      CO2 28.0 mmol/L      Calcium 8.9 mg/dL      eGFR Non African Amer 50 mL/min/1.73      BUN/Creatinine Ratio 19.0     Anion Gap 8.0 mmol/L     Narrative:       GFR Normal >60  Chronic Kidney Disease <60  Kidney Failure <15    CBC (No Diff) [[id number]]  (Normal) Collected:  11/04/19 0318    Specimen:  Blood Updated:  11/04/19 0359     WBC 8.20 10*3/mm3      RBC 3.91 10*6/mm3      Hemoglobin 12.9 g/dL      Hematocrit 36.7 %      MCV 93.8 fL      MCH 33.0 pg      MCHC 35.2 g/dL      RDW 13.0 %      RDW-SD 42.0 fl      MPV 7.6 fL      Platelets 198 10*3/mm3     Urinalysis With Culture If Indicated - Urine, Clean Catch [[id number]]  (Abnormal) Collected:  11/03/19 2230    Specimen:  Urine, Clean Catch Updated:  11/03/19 2346     Color, UA Yellow     Appearance, UA Clear     pH, UA 6.5     Specific Gravity, UA 1.018     Glucose, UA Negative     Ketones, UA Negative     Bilirubin, UA Negative     Blood, UA Negative     Protein, UA Negative     Leuk Esterase, UA Trace     Nitrite, UA Negative     Urobilinogen, UA 0.2 E.U./dL    Urinalysis, Microscopic Only - Urine, Clean Catch [[id number]]  (Abnormal) Collected:  11/03/19 2230    Specimen:  Urine, Clean Catch Updated:  11/03/19 2346     RBC, UA 0-2 /HPF      WBC, UA 6-12 /HPF      Bacteria, UA None Seen /HPF      Squamous Epithelial Cells, UA 0-2 /HPF      Hyaline Casts, UA 0-2 /LPF      Methodology Automated Microscopy    Troponin [[id number]]  (Normal) Collected:  11/03/19 0138    Specimen:  Blood Updated:  11/03/19 0315     Troponin T <0.010 ng/mL     Narrative:       Troponin T Reference Range:  <= 0.03 ng/mL-   Negative  for AMI  >0.03 ng/mL-     Abnormal for myocardial necrosis.  Clinicians would have to utilize clinical acumen, EKG, Troponin and serial changes to determine if it is an Acute Myocardial Infarction or myocardial injury due to an underlying chronic condition.     BNP [[id number]]  (Normal) Collected:  11/02/19 2017    Specimen:  Blood Updated:  11/02/19 2117     proBNP 1,094.0 pg/mL     Narrative:       Among patients with dyspnea, NT-proBNP is highly sensitive for the detection of acute congestive heart failure. In addition NT-proBNP of <300 pg/ml effectively rules out acute congestive heart failure with 99% negative predictive value.    Troponin [[id number]]  (Normal) Collected:  11/02/19 2017    Specimen:  Blood Updated:  11/02/19 2109     Troponin T <0.010 ng/mL      Comment: Specimen hemolyzed.  Results may be affected.       Narrative:       Troponin T Reference Range:  <= 0.03 ng/mL-   Negative for AMI  >0.03 ng/mL-     Abnormal for myocardial necrosis.  Clinicians would have to utilize clinical acumen, EKG, Troponin and serial changes to determine if it is an Acute Myocardial Infarction or myocardial injury due to an underlying chronic condition.     Basic Metabolic Panel [[id number]]  (Abnormal) Collected:  11/02/19 1642    Specimen:  Blood Updated:  11/02/19 1709     Glucose 134 mg/dL      BUN 28 mg/dL      Creatinine 1.40 mg/dL      Sodium 139 mmol/L      Potassium 4.2 mmol/L      Chloride 97 mmol/L      CO2 27.0 mmol/L      Calcium 10.0 mg/dL      eGFR Non African Amer 36 mL/min/1.73      BUN/Creatinine Ratio 20.0     Anion Gap 15.0 mmol/L     Narrative:       GFR Normal >60  Chronic Kidney Disease <60  Kidney Failure <15    Troponin [[id number]]  (Normal) Collected:  11/02/19 1642    Specimen:  Blood Updated:  11/02/19 1709     Troponin T <0.010 ng/mL     Narrative:       Troponin T Reference Range:  <= 0.03 ng/mL-   Negative for AMI  >0.03 ng/mL-     Abnormal for myocardial necrosis.  Clinicians would have  to utilize clinical acumen, EKG, Troponin and serial changes to determine if it is an Acute Myocardial Infarction or myocardial injury due to an underlying chronic condition.     BNP [[id number]]  (Normal) Collected:  11/02/19 1642    Specimen:  Blood Updated:  11/02/19 1707     proBNP 909.9 pg/mL     Narrative:       Among patients with dyspnea, NT-proBNP is highly sensitive for the detection of acute congestive heart failure. In addition NT-proBNP of <300 pg/ml effectively rules out acute congestive heart failure with 99% negative predictive value.    Protime-INR [[id number]]  (Normal) Collected:  11/02/19 1642    Specimen:  Blood Updated:  11/02/19 1702     Protime 10.7 Seconds      INR 1.02    aPTT [[id number]]  (Normal) Collected:  11/02/19 1642    Specimen:  Blood Updated:  11/02/19 1702     PTT 25.3 seconds     CBC & Differential [[id number]] Collected:  11/02/19 1642    Specimen:  Blood Updated:  11/02/19 1647    Narrative:       The following orders were created for panel order CBC & Differential.  Procedure                               Abnormality         Status                     ---------                               -----------         ------                     CBC Auto Differential[[id number]]        Normal              Final result                 Please view results for these tests on the individual orders.    CBC Auto Differential [[id number]]  (Normal) Collected:  11/02/19 1642    Specimen:  Blood Updated:  11/02/19 1647     WBC 9.70 10*3/mm3      RBC 4.60 10*6/mm3      Hemoglobin 14.7 g/dL      Hematocrit 42.4 %      MCV 92.2 fL      MCH 31.9 pg      MCHC 34.6 g/dL      RDW 12.8 %      RDW-SD 41.6 fl      MPV 7.8 fL      Platelets 284 10*3/mm3      Neutrophil % 65.0 %      Lymphocyte % 25.9 %      Monocyte % 6.3 %      Eosinophil % 1.9 %      Basophil % 0.9 %      Neutrophils, Absolute 6.30 10*3/mm3      Lymphocytes, Absolute 2.50 10*3/mm3      Monocytes, Absolute 0.60 10*3/mm3      Eosinophils,  Absolute 0.20 10*3/mm3      Basophils, Absolute 0.10 10*3/mm3      nRBC 0.1 /100 WBC              No results found for: BLOODCX     Urine Culture   Date Value Ref Range Status   11/03/2019 Growth present, too young to evaluate  Preliminary      No results found for: WOUNDCX   No results found for: RESPCX   No results found for: STOOLCX   No results found for: STOOLCXY   No results found for: MRSACX   No results found for: VRECX   No results found for: CRECX   No components found for: AFBSTAINCX   No results found for: AFBCX   No results found for: AFBCXBLD   No results found for: FUNGUSCX   No components found for: GMSSTAIN   No results found for: KOHPREP   No results found for: ANACX   No results found for: BODYFLDCX   No results found for: CSFCX   No results found for: CULTURE   No results found for: THROATCX   No results found for: THROATCXBS   No results found for: ICECX   No results found for: DICECX   No results found for: GCCX           Imaging Results (All)     Procedure Component Value Units Date/Time    XR Chest 1 View [[id number]] Collected:  11/02/19 1732     Updated:  11/02/19 1743    Narrative:       Examination: XR CHEST 1 VW-     Date of Exam: 11/2/2019 5:23 PM     Indication: weakness.     Comparison: None available.     Technique: 1 view of the chest      Findings:  There are no airspace consolidations. No pleural effusions. No  pneumothorax. The heart size is normal. The pulmonary vasculature  appears within normal limits. No acute osseous abnormality identified.       Impression:       No acute cardiopulmonary process.     Electronically Signed By-Felicitas Delacruz On:11/2/2019 5:32 PM  This report was finalized on [id number] by  Felicitas Delacruz, .            Condition on Discharge:  Stable     Vital Signs  Temp:  [97.3 °F (36.3 °C)-98.6 °F (37 °C)] 97.4 °F (36.3 °C)  Heart Rate:  [] 112  Resp:  [12-20] 20  BP: (107-130)/(47-79) 130/79    Physical Exam:  Physical Exam   Constitutional: No  distress.   Eyes: Pupils are equal, round, and reactive to light.   Cardiovascular:   Irregularly irregular but controlled   Pulmonary/Chest: Effort normal.   Abdominal: Soft.   Neurological: She is alert.   Skin: Skin is warm.       Discharge Disposition  Home or Self Care    Discharge Medications     Discharge Medications      New Medications      Instructions Start Date   apixaban 2.5 MG tablet tablet  Commonly known as:  ELIQUIS   2.5 mg, Oral, Every 12 Hours Scheduled      dilTIAZem  MG 24 hr capsule  Commonly known as:  CARDIZEM CD   240 mg, Oral, Every 24 Hours Scheduled   Start Date:  11/5/2019        Continue These Medications      Instructions Start Date   acetaminophen 325 MG tablet  Commonly known as:  TYLENOL   650 mg, Oral, 2 Times Daily      aspirin 81 MG tablet   1 tablet, Oral, Daily      Calcium Carbonate-Vitamin D 600-200 MG-UNIT tablet   1 tablet, Oral, 2 Times Daily      GLUCOSAMINE 1500 COMPLEX capsule   1 capsule, Oral, 2 Times Daily      lisinopril-hydrochlorothiazide 20-12.5 MG per tablet  Commonly known as:  PRINZIDE,ZESTORETIC   2 tablets, Oral, Daily      metoprolol tartrate 25 MG tablet  Commonly known as:  LOPRESSOR   25 mg, Oral, 2 Times Daily      multivitamin with minerals tablet tablet   1 tablet, Oral, Daily      pravastatin 40 MG tablet  Commonly known as:  PRAVACHOL   40 mg, Oral, Daily      raNITIdine 150 MG tablet  Commonly known as:  ZANTAC   150 mg, Oral, 2 Times Daily      travoprost (AV free) 0.004 % solution ophthalmic solution  Commonly known as:  TRAVATAN   1 drop, Both Eyes, Every Evening, in affected eye(s)       XIIDRA 5 % solution  Generic drug:  Lifitegrast   1 drop, Ophthalmic, 2 Times Daily      ZYRTEC ALLERGY 10 MG tablet  Generic drug:  cetirizine   1 tablet, Oral, Daily         Stop These Medications    amLODIPine 10 MG tablet  Commonly known as:  NORVASC            Discharge Diet:   Diet Instructions     Diet: Cardiac      Discharge Diet:  Cardiac           Activity at Discharge:   Activity Instructions     Activity as Tolerated            Follow-up Appointments  Future Appointments   Date Time Provider Department Center   12/27/2019 11:15 AM Ricky York MD [facility] None     Additional Instructions for the Follow-ups that You Need to Schedule     Discharge Follow-up with PCP   As directed       Currently Documented PCP:    Rikki Woodward MD    PCP Phone Number:    [phone]     Follow Up Details:  one week         Discharge Follow-up with Specified Provider: cardiology; 1 Week   As directed      To:  cardiology    Follow Up:  1 Week               Test Results Pending at Discharge   Order Current Status    Urine Culture - Urine, Urine, Clean Catch Preliminary result           Risk for Readmission (LACE) Score: 2 (11/4/2019  6:00 AM)          Monico Chavez DO  11/04/19  12:49 PM

## 2019-11-04 NOTE — PROGRESS NOTES
[facility]        LOS:  LOS: 0 days   Patient Name: Marisol Snyder  Age/Sex: 84 y.o. female  : 1935  MRN: [id number]    Day of Service: 19   Length of Stay: 0  Encounter Provider: MERNA Cheng  Place of Service: [facility] CARDIOLOGY  Patient Care Team:  Rikki Woodward MD as PCP - General (Family Medicine)    Subjective:     Chief Complaint:  F/U Afib    Subjective:   Patient denies CP or SOA this am.  Feels well.    Current Medications:   Scheduled Meds:  acetaminophen 650 mg Oral BID   amLODIPine 10 mg Oral Daily   apixaban 2.5 mg Oral Q12H   aspirin 81 mg Oral Daily   atorvastatin 10 mg Oral Daily   calcium-vitamin D 1 tablet Oral BID   cetirizine 10 mg Oral Daily   dilTIAZem  mg Oral Q24H   famotidine 20 mg Oral BID AC   GI cocktail  Oral Once   latanoprost 1 drop Both Eyes Nightly   lisinopril-hydroCHLOROthiazide (ZESTORTIC) 20-12.5 mg combo dose  Oral Q24H   metoprolol tartrate 25 mg Oral TID   sodium chloride 10 mL Intravenous Q12H   THERA 1 tablet Oral Daily     Continuous Infusions:  sodium chloride 100 mL/hr Last Rate: 100 mL/hr (19 09)       Allergies:  No Known Allergies    ROS    Objective:     Temp:  [97.3 °F (36.3 °C)-98.6 °F (37 °C)] 97.4 °F (36.3 °C)  Heart Rate:  [] 79  Resp:  [12-20] 18  BP: (107-121)/(47-66) 121/66     Intake/Output Summary (Last 24 hours) at 2019 0930  Last data filed at 2019 0929  Gross per 24 hour   Intake 250 ml   Output 550 ml   Net -300 ml     Body mass index is 27.1 kg/m².      19  16119  0644   Weight: 72 kg (158 lb 11.7 oz) 70.9 kg (156 lb 4.9 oz) 69.4 kg (152 lb 16 oz)         Physical Exam:  Neuro:  CV:  Resp:  GI:  Ext:  Tele: AAOx3, cooperative  Irregular S1S2, no murmur  Non-labored, CTA  BS+, abd soft  Pedal palp, trace non-pitting BLE edema  afib with CVR                                                   Lab Review:    Results from last 7 days   Lab Units 11/04/19  0318 11/02/19  1642   SODIUM mmol/L 139 139   POTASSIUM mmol/L 3.5 4.2   CHLORIDE mmol/L 103 97*   CO2 mmol/L 28.0 27.0   BUN mg/dL 20 28*   CREATININE mg/dL 1.05* 1.40*   GLUCOSE mg/dL 101* 134*   CALCIUM mg/dL 8.9 10.0     Results from last 7 days   Lab Units 11/03/19  0138 11/02/19 2017 11/02/19  1642   TROPONIN T ng/mL <0.010 <0.010 <0.010     Results from last 7 days   Lab Units 11/04/19  0318 11/02/19  1642   WBC 10*3/mm3 8.20 9.70   HEMOGLOBIN g/dL 12.9 14.7   HEMATOCRIT % 36.7 42.4   PLATELETS 10*3/mm3 198 284     Results from last 7 days   Lab Units 11/02/19  1642   INR  1.02   APTT seconds 25.3               Invalid input(s): LDLCALC  Results from last 7 days   Lab Units 11/02/19 2017 11/02/19  1642   PROBNP pg/mL 1,094.0 909.9           Recent Radiology:  Imaging Results (Most Recent)     Procedure Component Value Units Date/Time    XR Chest 1 View [[id number]] Collected:  11/02/19 1732     Updated:  11/02/19 1743    Narrative:       Examination: XR CHEST 1 VW-     Date of Exam: 11/2/2019 5:23 PM     Indication: weakness.     Comparison: None available.     Technique: 1 view of the chest      Findings:  There are no airspace consolidations. No pleural effusions. No  pneumothorax. The heart size is normal. The pulmonary vasculature  appears within normal limits. No acute osseous abnormality identified.       Impression:       No acute cardiopulmonary process.     Electronically Signed By-Felicitas Delacruz On:11/2/2019 5:32 PM  This report was finalized on [id number] by  Felicitas Delacruz, .          ECHOCARDIOGRAM:           I reviewed the patient's new clinical results.    EKG:      Assessment:       Atrial fibrillation with rapid ventricular response (CMS/HCC)    GERD (gastroesophageal reflux disease)    Hypertension    1) New Afib  - troponin negative x 3  - CVR on BB, CCB  - on eliquis for AC  - 2D echo pending    2) JIGAR  - Cr 1.4 --> 1.05 on IVFs    3)  HTN    4) HLD    5) GERD    Plan:   Tele shows rate controlled afib on metoprolol tartrate 25 mg PO tid and cardizem  mg PO daily without any further bradycardia.  On eliquis for AC.  Cr improved on IVFs.  2D echo is pending.  As d/w Dr. York, pt planned for outpatient eval possible cardioversion after 6 weeks anticoagulation if she remains in afib.      Addendum: As d/w Dr. York, may review 2D echo results as outpatient.  Ok for discharge home.  F/U with Dr. York on 12/27 at 11:15 am.      Mary Hawley, MERNA  11/04/19  9:30 AM

## 2019-11-04 NOTE — PLAN OF CARE
Problem: Patient Care Overview  Goal: Plan of Care Review  Outcome: Ongoing (interventions implemented as appropriate)      Problem: Fall Risk (Adult)  Goal: Identify Related Risk Factors and Signs and Symptoms  Outcome: Ongoing (interventions implemented as appropriate)      Problem: Arrhythmia/Dysrhythmia (Symptomatic) (Adult)  Goal: Signs and Symptoms of Listed Potential Problems Will be Absent, Minimized or Managed (Arrhythmia/Dysrhythmia)  Outcome: Ongoing (interventions implemented as appropriate)

## 2019-11-04 NOTE — PLAN OF CARE
Problem: Patient Care Overview  Goal: Plan of Care Review  Outcome: Ongoing (interventions implemented as appropriate)   11/04/19 1249   Coping/Psychosocial   Plan of Care Reviewed With patient   Plan of Care Review   Progress improving   OTHER   Outcome Summary Patient okay to discharge today, follow up in cardio office for results of echo     Goal: Interprofessional Rounds/Family Conf  Outcome: Ongoing (interventions implemented as appropriate)   11/04/19 1249   Interdisciplinary Rounds/Family Conf   Summary Discharge pending echo   Participants ;social work/services;pharmacy;nursing       Problem: Arrhythmia/Dysrhythmia (Symptomatic) (Adult)  Goal: Signs and Symptoms of Listed Potential Problems Will be Absent, Minimized or Managed (Arrhythmia/Dysrhythmia)  Outcome: Ongoing (interventions implemented as appropriate)   11/04/19 1249   Goal/Outcome Evaluation   Problems Assessed (Arrhythmia/Dysrhythmia) all   Problems Present (Dysrhythmia) none

## 2019-11-04 NOTE — PROGRESS NOTES
[facility] Medicine Services Daily Progress Note      Hospitalist Team  LOS 0 days      Patient Care Team:  Rikki Woodward MD as PCP - General (Family Medicine)        Chief Complaint / Subjective  Chief Complaint   Patient presents with   • Weakness - Generalized     11/3: The patient reports feeling better today.  She denies any palpitations, chest pain, shortness of breath, lightheadedness or near syncope.    Brief Synopsis of Hospital Course/HPI  Ms. Snyder is a 84 y.o.  presents to [facility] complaining of general weakness since the morning of admission. She denies dizziness, chest pain, dyspnea, fever, headache, visual problems or fever. Family at bedside at the time of admission reported that she almost passed out while shopping today. In ED she was foungd to be in atrial fibrillation with RVR. Cardizem bolus and drip were given with reduced rate. She denies any PMH of CAD,CHF or cardiac problems. She denies smoking. She reports a PMH of GERD , HTN, HLD glaucoma and osteoporosis. In ED troponin was 0.010 but BNP was 909. She has no BLE edema and breath sounds are clear. Cr is 1.4. Wbc normal.  Urinalysis clear.  Cxr no acute process and patient is afebrile afebrile. She will be admitted for serial troponins.  Patient received IV Lasix and was started on Cardizem. Cardiology consulted and plans to try to maintain a controlled heart rate with dual blockade with beta blockers and check calcium channel blockers and monitor her to see if she will convert back to sinus rhythm.        Review of systems   12 point review of systems was reviewed and was negative except as above.      History reviewed. No pertinent family history.    Past Medical History:   Diagnosis Date   • GERD (gastroesophageal reflux disease)    • Glaucoma    • Hypertension    • Osteoarthritis        Social History     Socioeconomic History   • Marital status:      Spouse name: Not on file   •  "Number of children: Not on file   • Years of education: Not on file   • Highest education level: Not on file   Tobacco Use   • Smoking status: Never Smoker   • Smokeless tobacco: Never Used   Substance and Sexual Activity   • Alcohol use: No     Frequency: Never   • Drug use: No   • Sexual activity: Defer           Objective      Vital Signs  Temp:  [97.4 °F (36.3 °C)-98.6 °F (37 °C)] 98.6 °F (37 °C)  Heart Rate:  [] 118  Resp:  [14-20] 16  BP: ()/(41-70) 107/58  Oxygen Therapy  SpO2: 96 %  Pulse Oximetry Type: Continuous  Device (Oxygen Therapy): room air  Flowsheet Rows      First Filed Value   Admission Height  160 cm (63\") Documented at 11/02/2019 1614   Admission Weight  72 kg (158 lb 11.7 oz) Documented at 11/02/2019 1614        Intake & Output (last 3 days)       11/01 0701 - 11/02 0700 11/02 0701 - 11/03 0700 11/03 0701 - 11/04 0700           Urine Unmeasured Occurrence  1 x         Lines, Drains & Airways    Active LDAs     Name:   Placement date:   Placement time:   Site:   Days:    Peripheral IV 11/02/19 1637 Left Wrist   11/02/19    1637    Wrist   1                  Physical Exam:   Well-developed well-nourished female in no acute distress sitting up in bed awake and alert; mucous membranes moist; sclerae anicteric;  lungs clear to auscultation bilaterally; CV regular rate and rhythm; abdomen soft nontender nondistended with active bowel sounds; extremities with no edema, cyanosis or calf tenderness; palpable pedal pulses bilaterally; no Byrne catheter.      Procedures:              Results Review:     I reviewed the patient's new clinical results.    Results from last 7 days   Lab Units 11/02/19  1642   WBC 10*3/mm3 9.70   HEMOGLOBIN g/dL 14.7   HEMATOCRIT % 42.4   PLATELETS 10*3/mm3 284     Results from last 7 days   Lab Units 11/02/19  1642   SODIUM mmol/L 139   POTASSIUM mmol/L 4.2   CHLORIDE mmol/L 97*   CO2 mmol/L 27.0   BUN mg/dL 28*   CREATININE mg/dL 1.40*   CALCIUM mg/dL 10.0 "   GLUCOSE mg/dL 134*         Lab Results   Component Value Date    CALCIUM 10.0 11/02/2019     No results found for: HGBA1C  Results from last 7 days   Lab Units 11/02/19  1642   INR  1.02               Microbiology Results (last 10 days)     ** No results found for the last 240 hours. **          ECG/EMG Results (most recent)     Procedure Component Value Units Date/Time    ECG 12 Lead [[id number]] Collected:  11/02/19 1643     Updated:  11/03/19 0638    Narrative:       HEART RATE= 115  bpm  RR Interval= 522  ms  FL Interval=   ms  P Horizontal Axis=   deg  P Front Axis=   deg  QRSD Interval= 83  ms  QT Interval= 330  ms  QRS Axis= 62  deg  T Wave Axis= -29  deg  - ABNORMAL ECG -  Atrial fibrillation  No previous ECG available for comparison  Electronically Signed By: Daniel Barron (Fox) 03-Nov-2019 06:37:49  Date and Time of Study: 2019-11-02 16:43:52    ECG 12 Lead [[id number]] Collected:  11/03/19 1544     Updated:  11/03/19 1547    Narrative:       HEART RATE= 90  bpm  RR Interval= 663  ms  FL Interval=   ms  P Horizontal Axis=   deg  P Front Axis=   deg  QRSD Interval= 85  ms  QT Interval= 321  ms  QRS Axis= 56  deg  T Wave Axis= -57  deg  - ABNORMAL ECG -  Atrial fibrillation  Electronically Signed By:   Date and Time of Study: 2019-11-03 15:44:56                    Xr Chest 1 View    Result Date: 11/2/2019  No acute cardiopulmonary process.  Electronically Signed By-Felicitas Delacruz On:11/2/2019 5:32 PM This report was finalized on [id number] by  Felicitas Delacruz .      Xrays, labs reviewed personally by physician.    Medication Review:   I have reviewed the patient's current medication list      Scheduled Meds    acetaminophen 650 mg Oral BID   amLODIPine 10 mg Oral Daily   apixaban 2.5 mg Oral Q12H   aspirin 81 mg Oral Daily   atorvastatin 10 mg Oral Daily   calcium-vitamin D 1 tablet Oral BID   cetirizine 10 mg Oral Daily   dilTIAZem  mg Oral Q24H   famotidine 20 mg Oral BID AC   GI cocktail  Oral Once    latanoprost 1 drop Both Eyes Nightly   lisinopril-hydroCHLOROthiazide (ZESTORTIC) 20-12.5 mg combo dose  Oral Q24H   metoprolol tartrate 25 mg Oral TID   sodium chloride 10 mL Intravenous Q12H   THERA 1 tablet Oral Daily       Meds Infusions    sodium chloride 100 mL/hr Last Rate: 100 mL/hr (11/03/19 1926)       Meds PRN  calcium carbonate  •  Insert peripheral IV **AND** sodium chloride  •  sodium chloride        Assessment / Plan    Active Hospital Problems    Diagnosis  POA   • Atrial fibrillation with rapid ventricular response (CMS/HCC) [I48.91]  Yes      Resolved Hospital Problems   No resolved problems to display.     New onset atrial fib with RVR  -rate controlled with Cardizem and metoprolol  -Patient has been started on Eliquis for stroke prevention  -Echo to evaluate for structural heart disease pending    General weakness likely secondary to above   -MI ruled out by serial troponins    Acute renal injury Cr 1.4 secondary to ACE inhibitor versus CKD secondary to hypertension  - monitor and if creatinine does not normalize then consider renal ultrasound and urine electrolytes  -Urinalysis is pending collection     Primary HTN, chronic and controlled   -continue home Norvasc, Zestoretic, metoprolol      HLD- on statin      OA- on glucosamine chondrotin      GERD- on Zantac     Glaucoma - on home eye drops    DVT prophylaxis -patient is on apixaban      Marie Patterson MD  11/03/19  8:18 PM

## 2019-11-06 ENCOUNTER — TELEPHONE (OUTPATIENT)
Dept: CARDIOLOGY | Facility: CLINIC | Age: 84
End: 2019-11-06

## 2019-11-06 NOTE — TELEPHONE ENCOUNTER
Mrs Snyder is requesting her echocardiogram results. Please call her daughter    Maxine @ [phone]

## 2020-01-10 ENCOUNTER — OFFICE VISIT (OUTPATIENT)
Dept: CARDIOLOGY | Facility: CLINIC | Age: 85
End: 2020-01-10

## 2020-01-10 VITALS
WEIGHT: 157 LBS | OXYGEN SATURATION: 95 % | SYSTOLIC BLOOD PRESSURE: 126 MMHG | HEIGHT: 63 IN | DIASTOLIC BLOOD PRESSURE: 80 MMHG | RESPIRATION RATE: 18 BRPM | BODY MASS INDEX: 27.82 KG/M2 | HEART RATE: 66 BPM

## 2020-01-10 DIAGNOSIS — I48.91 ATRIAL FIBRILLATION WITH RAPID VENTRICULAR RESPONSE (HCC): Primary | ICD-10-CM

## 2020-01-10 DIAGNOSIS — E78.2 MIXED HYPERLIPIDEMIA: ICD-10-CM

## 2020-01-10 DIAGNOSIS — K21.00 GASTROESOPHAGEAL REFLUX DISEASE WITH ESOPHAGITIS: ICD-10-CM

## 2020-01-10 DIAGNOSIS — I10 ESSENTIAL HYPERTENSION: ICD-10-CM

## 2020-01-10 PROCEDURE — 99214 OFFICE O/P EST MOD 30 MIN: CPT | Performed by: INTERNAL MEDICINE

## 2020-01-11 NOTE — PROGRESS NOTES
Subjective:     Encounter Date:01/10/2020      Patient ID: Marisol Snyder is a 84 y.o. female.    Chief Complaint:  Chief Complaint   Patient presents with   • Atrial Fibrillation     Hosp f/u   • Hypertension   • Hyperlipidemia       HPI:  I am seeing Virginia who is a pleasant 84-year-old female for new onset atrial fibrillation which is a new complaint to me.    She has past medical history significant for dyslipidemia, hypertension and recent diagnosis of atrial fibrillation with rapid ventricular response leading to hospitalization 11/4/2019.  She also has a past medical history significant for gastroesophageal reflux disease.    Today she returns in sinus rhythm with rate and rhythm control at 66 bpm.  I personally reviewed her ECG tracing from 11/3/2019 which showed atrial fibrillation at 115 bpm with otherwise normal ECG.  I also personally reviewed her echocardiogram from 11/4/2019 which showed normal LV systolic function with moderate pulmonic regurgitation mild tricuspid and mitral regurgitation.    She has no complaints from a cardiovascular standpoint.        The following portions of the patient's history were reviewed and updated as appropriate: allergies, current medications, past family history, past medical history, past social history, past surgical history and problem list.    Problem List:  Patient Active Problem List   Diagnosis   • Atrial fibrillation with rapid ventricular response (CMS/[facility])   • GERD (gastroesophageal reflux disease)   • Hypertension   • Hyperlipidemia       Past Medical History:  Past Medical History:   Diagnosis Date   • JIGAR (acute kidney injury) (CMS/HCC)    • GERD (gastroesophageal reflux disease)    • Glaucoma    • History of echocardiogram 11/04/2019    Moderate IA, Mild MR/TR, LA moderate-severely dilated, EF 61-65%   • Hyperlipidemia    • Hypertension    • Osteoarthritis        Past Surgical History:  History reviewed. No pertinent surgical history.    Social  "History:  Social History     Socioeconomic History   • Marital status:      Spouse name: Not on file   • Number of children: Not on file   • Years of education: Not on file   • Highest education level: Not on file   Tobacco Use   • Smoking status: Never Smoker   • Smokeless tobacco: Never Used   Substance and Sexual Activity   • Alcohol use: No     Frequency: Never   • Drug use: No   • Sexual activity: Defer       Allergies:  No Known Allergies      Review of Symptoms:  Constitutional: Patient afebrile no chills or unexpected weight changes  Respiratory: No cough, no wheezing or dyspnea  Cardiovascular: No chest pain, palpitations, dyspnea, orthopnea and no edema  Gastrointestinal: No nausea, vomiting, constipation or diarrhea.  No melena or dark stools    All other systems reviewed and are negative         Objective:         /80 (BP Location: Left arm, Patient Position: Sitting, Cuff Size: Large Adult)   Pulse 66   Resp 18   Ht 160 cm (63\")   Wt 71.2 kg (157 lb)   SpO2 95%   BMI 27.81 kg/m²     Physical exam  Constitutional: well-nourished, and appears stated age in no acute distress  PERRL: Conjunctiva clear, no pallor, anicteric  HENMT: normocephalic, normal dentition, no cyanosis or pallor  Neck:no bruits, or thrills and bilateral normal carotid upstroke. Normal jugular venous pressure  Cardiovascular: No parasternal heaves an non-displaced focal PMI. Normal rate and rhythm: no rub, gallop, murmur or click and normal S1 and S2; no lower or upper extremity edema.   Lungs: unlabored, no wheezing with no rales or rhonchi on auscultation.  Extremities: Warm, no clubbing, cyanosis. Full and equal peripheral pulses in extremities with no bruits appreciated.   Abdomen: soft, non-tender, non-distended  Musculoskeletal: no joint tenderness or swelling and no erythema  Skin: Warm and dry, non-erythematous   Neuro:alert and normal affect. Oriented to time, place and person.           In-Office " Procedure(s):  Procedures    ASCVD RIsk Score::  The ASCVD Risk score (Sharpsburgjoni SHAY Jr., et al., 2013) failed to calculate for the following reasons:    The 2013 ASCVD risk score is only valid for ages 40 to 79    Recent Radiology:  Imaging Results (Most Recent)     None          Lab Review:   No visits with results within 2 Month(s) from this visit.   Latest known visit with results is:   Admission on 11/02/2019, Discharged on 11/04/2019   Component Date Value   • Glucose 11/02/2019 134*   • BUN 11/02/2019 28*   • Creatinine 11/02/2019 1.40*   • Sodium 11/02/2019 139    • Potassium 11/02/2019 4.2    • Chloride 11/02/2019 97*   • CO2 11/02/2019 27.0    • Calcium 11/02/2019 10.0    • eGFR Non  Amer 11/02/2019 36*   • BUN/Creatinine Ratio 11/02/2019 20.0    • Anion Gap 11/02/2019 15.0    • Protime 11/02/2019 10.7    • INR 11/02/2019 1.02    • PTT 11/02/2019 25.3    • proBNP 11/02/2019 909.9    • Troponin T 11/02/2019 <0.010    • Color, UA 11/03/2019 Yellow    • Appearance, UA 11/03/2019 Clear    • pH, UA 11/03/2019 6.5    • Specific Gravity, UA 11/03/2019 1.018    • Glucose, UA 11/03/2019 Negative    • Ketones, UA 11/03/2019 Negative    • Bilirubin, UA 11/03/2019 Negative    • Blood, UA 11/03/2019 Negative    • Protein, UA 11/03/2019 Negative    • Leuk Esterase, UA 11/03/2019 Trace*   • Nitrite, UA 11/03/2019 Negative    • Urobilinogen, UA 11/03/2019 0.2 E.U./dL    • WBC 11/02/2019 9.70    • RBC 11/02/2019 4.60    • Hemoglobin 11/02/2019 14.7    • Hematocrit 11/02/2019 42.4    • MCV 11/02/2019 92.2    • MCH 11/02/2019 31.9    • MCHC 11/02/2019 34.6    • RDW 11/02/2019 12.8    • RDW-SD 11/02/2019 41.6    • MPV 11/02/2019 7.8    • Platelets 11/02/2019 284    • Neutrophil % 11/02/2019 65.0    • Lymphocyte % 11/02/2019 25.9    • Monocyte % 11/02/2019 6.3    • Eosinophil % 11/02/2019 1.9    • Basophil % 11/02/2019 0.9    • Neutrophils, Absolute 11/02/2019 6.30    • Lymphocytes, Absolute 11/02/2019 2.50    • Monocytes,  Absolute 11/02/2019 0.60    • Eosinophils, Absolute 11/02/2019 0.20    • Basophils, Absolute 11/02/2019 0.10    • nRBC 11/02/2019 0.1    • Troponin T 11/02/2019 <0.010    • Troponin T 11/03/2019 <0.010    • proBNP 11/02/2019 1,094.0    • RBC, UA 11/03/2019 0-2*   • WBC, UA 11/03/2019 6-12*   • Bacteria, UA 11/03/2019 None Seen    • Squamous Epithelial Cell* 11/03/2019 0-2    • Hyaline Casts, UA 11/03/2019 0-2    • Methodology 11/03/2019 Automated Microscopy    • Urine Culture 11/03/2019 <25,000 CFU/mL Gram Positive Cocci*   • WBC 11/04/2019 8.20    • RBC 11/04/2019 3.91    • Hemoglobin 11/04/2019 12.9    • Hematocrit 11/04/2019 36.7    • MCV 11/04/2019 93.8    • MCH 11/04/2019 33.0    • MCHC 11/04/2019 35.2    • RDW 11/04/2019 13.0    • RDW-SD 11/04/2019 42.0    • MPV 11/04/2019 7.6    • Platelets 11/04/2019 198    • Glucose 11/04/2019 101*   • BUN 11/04/2019 20    • Creatinine 11/04/2019 1.05*   • Sodium 11/04/2019 139    • Potassium 11/04/2019 3.5    • Chloride 11/04/2019 103    • CO2 11/04/2019 28.0    • Calcium 11/04/2019 8.9    • eGFR Non African Amer 11/04/2019 50*   • BUN/Creatinine Ratio 11/04/2019 19.0    • Anion Gap 11/04/2019 8.0    • Magnesium 11/04/2019 1.6    • BSA 11/04/2019 1.7    • BH CV ECHO CARYN - RVDD 11/04/2019 3.6    • IVSd 11/04/2019 1.3    • LVIDd 11/04/2019 3.4    • LVIDs 11/04/2019 2.4    • LVPWd 11/04/2019 1.3    • IVS/LVPW 11/04/2019 1.0    • FS 11/04/2019 31.0    • EDV(Teich) 11/04/2019 48.4    • ESV(Teich) 11/04/2019 19.5    • EF(Teich) 11/04/2019 59.8    • EDV(cubed) 11/04/2019 40.3    • ESV(cubed) 11/04/2019 13.2    • EF(cubed) 11/04/2019 67.2    • LV mass(C)d 11/04/2019 150.3    • LV mass(C)dI 11/04/2019 87.3    • SV(Teich) 11/04/2019 29.0    • SI(Teich) 11/04/2019 16.8    • SV(cubed) 11/04/2019 27.1    • SI(cubed) 11/04/2019 15.7    • Ao root diam 11/04/2019 2.5    • Ao root area 11/04/2019 5.1    • ACS 11/04/2019 1.7    • asc Aorta Diam 11/04/2019 2.7    • LVOT diam 11/04/2019 1.9     • LVOT area 11/04/2019 2.9    • RVOT diam 11/04/2019 2.5    • RVOT area 11/04/2019 5.0    • EDV(MOD-sp4) 11/04/2019 30.0    • ESV(MOD-sp4) 11/04/2019 15.9    • EF(MOD-sp4) 11/04/2019 46.8    • EDV(MOD-sp2) 11/04/2019 26.5    • ESV(MOD-sp2) 11/04/2019 12.6    • EF(MOD-sp2) 11/04/2019 52.4    • SV(MOD-sp4) 11/04/2019 14.0    • SI(MOD-sp4) 11/04/2019 8.1    • SV(MOD-sp2) 11/04/2019 13.9    • SI(MOD-sp2) 11/04/2019 8.1    • Ao root area (BSA correc* 11/04/2019 1.5    • LV Valadez Vol (BSA correct* 11/04/2019 17.4    • LV Sys Vol (BSA correcte* 11/04/2019 9.3    • MV V2 max 11/04/2019 126.2    • MV max PG 11/04/2019 6.4    • MV V2 mean 11/04/2019 62.3    • MV mean PG 11/04/2019 2.1    • MV V2 VTI 11/04/2019 23.3    • MVA(VTI) 11/04/2019 2.8    • Ao pk enrico 11/04/2019 144.6    • Ao max PG 11/04/2019 8.4    • Ao max PG (full) 11/04/2019 3.4    • Ao V2 mean 11/04/2019 116.1    • Ao mean PG 11/04/2019 5.6    • Ao mean PG (full) 11/04/2019 3.3    • Ao V2 VTI 11/04/2019 28.9    • LISA(I,A) 11/04/2019 2.2    • LISA(I,D) 11/04/2019 2.2    • LISA(V,A) 11/04/2019 2.2    • LISA(V,D) 11/04/2019 2.2    • LV V1 max PG 11/04/2019 5.0    • LV V1 mean PG 11/04/2019 2.3    • LV V1 max 11/04/2019 111.5    • LV V1 mean 11/04/2019 70.2    • LV V1 VTI 11/04/2019 22.0    • SV(Ao) 11/04/2019 147.5    • SI(Ao) 11/04/2019 85.7    • SV(LVOT) 11/04/2019 64.2    • SI(LVOT) 11/04/2019 37.3    • PA V2 max 11/04/2019 104.2    • PA max PG 11/04/2019 4.3    • PA V2 mean 11/04/2019 71.7    • PA mean PG 11/04/2019 2.3    • PA V2 VTI 11/04/2019 19.2    • PI end-d enrico 11/04/2019 75.0    • PI max enrico 11/04/2019 154.4    • PI max PG 11/04/2019 9.5    • TR max enrico 11/04/2019 245.5    • RVSP(TR) 11/04/2019 27.3    • RAP systole 11/04/2019 3.0    •  CV ECHO CARYN - BZI_BMI 11/04/2019 26.9    •  CV ECHO CARYN - BSA(HA* 11/04/2019 1.8    •  CV ECHO CARYN - BZI_ME* 11/04/2019 68.9    •  CV ECHO CARYN - BZI_ME* 11/04/2019 160.0    • EF(MOD-bp) 11/04/2019 50.0    • LA  dimension(2D) 11/04/2019 3.5               Invalid input(s): ALKPO4                        Invalid input(s): LDLCALC                Assessment:          Diagnosis Plan   1. Atrial fibrillation with rapid ventricular response (CMS/HCC)     2. Mixed hyperlipidemia     3. Essential hypertension     4. Gastroesophageal reflux disease with esophagitis            Plan:         1. Atrial fibrillation with rapid ventricular response (CMS/HCC) which is a new complaint to me  Rate and rhythm controlled currently on diltiazem 240 mg XL.  Anticoagulated on Eliquis 2.5 mg p.o. twice daily.  We will continue this therapy.    2. Mixed hyperlipidemia  Well-controlled on medical therapy    3. Essential hypertension  Well-controlled on medical therapy no changes today.    4. Gastroesophageal reflux disease with esophagitis  Continue proton pump inhibitor.    Greater than 31 minutes of face-to-face time was spent with the patient and family, of which greater than 50% was spent counseling specifically discussing the risks and benefits of anticoagulation in the setting of paroxysmal atrial fibrillation.        Level of Care:                 Ricky York MD  01/11/20  .

## 2020-08-13 ENCOUNTER — APPOINTMENT (OUTPATIENT)
Dept: GENERAL RADIOLOGY | Facility: HOSPITAL | Age: 85
End: 2020-08-13

## 2020-08-13 ENCOUNTER — HOSPITAL ENCOUNTER (OUTPATIENT)
Facility: HOSPITAL | Age: 85
Setting detail: OBSERVATION
Discharge: HOME HEALTH | End: 2020-08-15
Attending: HOSPITALIST | Admitting: HOSPITALIST

## 2020-08-13 DIAGNOSIS — R07.9 CHEST PAIN, UNSPECIFIED TYPE: Primary | ICD-10-CM

## 2020-08-13 PROBLEM — N18.30 CHRONIC RENAL INSUFFICIENCY, STAGE III (MODERATE): Status: ACTIVE | Noted: 2020-08-13

## 2020-08-13 PROBLEM — M25.512 ACUTE PAIN OF LEFT SHOULDER: Status: ACTIVE | Noted: 2020-08-13

## 2020-08-13 PROBLEM — I48.0 PAROXYSMAL ATRIAL FIBRILLATION (HCC): Status: ACTIVE | Noted: 2019-11-02

## 2020-08-13 LAB
ANION GAP SERPL CALCULATED.3IONS-SCNC: 14 MMOL/L (ref 5–15)
APTT PPP: 32.1 SECONDS (ref 24–31)
BASOPHILS # BLD AUTO: 0.1 10*3/MM3 (ref 0–0.2)
BASOPHILS NFR BLD AUTO: 0.6 % (ref 0–1.5)
BUN SERPL-MCNC: 14 MG/DL (ref 8–23)
BUN SERPL-MCNC: ABNORMAL MG/DL
BUN/CREAT SERPL: ABNORMAL
CALCIUM SPEC-SCNC: 10.5 MG/DL (ref 8.6–10.5)
CHLORIDE SERPL-SCNC: 95 MMOL/L (ref 98–107)
CHOLEST SERPL-MCNC: 160 MG/DL (ref 0–200)
CO2 SERPL-SCNC: 24 MMOL/L (ref 22–29)
CREAT SERPL-MCNC: 1.11 MG/DL (ref 0.57–1)
DEPRECATED RDW RBC AUTO: 42 FL (ref 37–54)
EOSINOPHIL # BLD AUTO: 0 10*3/MM3 (ref 0–0.4)
EOSINOPHIL NFR BLD AUTO: 0.4 % (ref 0.3–6.2)
ERYTHROCYTE [DISTWIDTH] IN BLOOD BY AUTOMATED COUNT: 13.1 % (ref 12.3–15.4)
GFR SERPL CREATININE-BSD FRML MDRD: 47 ML/MIN/1.73
GLUCOSE SERPL-MCNC: 125 MG/DL (ref 65–99)
HCT VFR BLD AUTO: 42.9 % (ref 34–46.6)
HDLC SERPL-MCNC: 61 MG/DL (ref 40–60)
HGB BLD-MCNC: 14.6 G/DL (ref 12–15.9)
INR PPP: 1 (ref 0.93–1.1)
LDLC SERPL CALC-MCNC: 85 MG/DL (ref 0–100)
LDLC/HDLC SERPL: 1.39 {RATIO}
LYMPHOCYTES # BLD AUTO: 1.8 10*3/MM3 (ref 0.7–3.1)
LYMPHOCYTES NFR BLD AUTO: 17.2 % (ref 19.6–45.3)
MCH RBC QN AUTO: 31.2 PG (ref 26.6–33)
MCHC RBC AUTO-ENTMCNC: 34 G/DL (ref 31.5–35.7)
MCV RBC AUTO: 91.6 FL (ref 79–97)
MONOCYTES # BLD AUTO: 0.5 10*3/MM3 (ref 0.1–0.9)
MONOCYTES NFR BLD AUTO: 4.9 % (ref 5–12)
NEUTROPHILS NFR BLD AUTO: 76.9 % (ref 42.7–76)
NEUTROPHILS NFR BLD AUTO: 8 10*3/MM3 (ref 1.7–7)
NRBC BLD AUTO-RTO: 0 /100 WBC (ref 0–0.2)
NT-PROBNP SERPL-MCNC: 171.4 PG/ML (ref 0–1800)
PLATELET # BLD AUTO: 234 10*3/MM3 (ref 140–450)
PMV BLD AUTO: 7.1 FL (ref 6–12)
POTASSIUM SERPL-SCNC: 3.9 MMOL/L (ref 3.5–5.2)
PROTHROMBIN TIME: 10.9 SECONDS (ref 9.6–11.7)
RBC # BLD AUTO: 4.69 10*6/MM3 (ref 3.77–5.28)
SODIUM SERPL-SCNC: 133 MMOL/L (ref 136–145)
TRIGL SERPL-MCNC: 71 MG/DL (ref 0–150)
TROPONIN T SERPL-MCNC: <0.01 NG/ML (ref 0–0.03)
TROPONIN T SERPL-MCNC: <0.01 NG/ML (ref 0–0.03)
VLDLC SERPL-MCNC: 14.2 MG/DL
WBC # BLD AUTO: 10.4 10*3/MM3 (ref 3.4–10.8)

## 2020-08-13 PROCEDURE — 85610 PROTHROMBIN TIME: CPT | Performed by: NURSE PRACTITIONER

## 2020-08-13 PROCEDURE — 80061 LIPID PANEL: CPT | Performed by: NURSE PRACTITIONER

## 2020-08-13 PROCEDURE — 83880 ASSAY OF NATRIURETIC PEPTIDE: CPT | Performed by: NURSE PRACTITIONER

## 2020-08-13 PROCEDURE — 99285 EMERGENCY DEPT VISIT HI MDM: CPT

## 2020-08-13 PROCEDURE — 99219 PR INITIAL OBSERVATION CARE/DAY 50 MINUTES: CPT | Performed by: NURSE PRACTITIONER

## 2020-08-13 PROCEDURE — 84484 ASSAY OF TROPONIN QUANT: CPT | Performed by: NURSE PRACTITIONER

## 2020-08-13 PROCEDURE — 73030 X-RAY EXAM OF SHOULDER: CPT

## 2020-08-13 PROCEDURE — 84443 ASSAY THYROID STIM HORMONE: CPT | Performed by: NURSE PRACTITIONER

## 2020-08-13 PROCEDURE — 93005 ELECTROCARDIOGRAM TRACING: CPT | Performed by: NURSE PRACTITIONER

## 2020-08-13 PROCEDURE — 71045 X-RAY EXAM CHEST 1 VIEW: CPT

## 2020-08-13 PROCEDURE — 85025 COMPLETE CBC W/AUTO DIFF WBC: CPT | Performed by: NURSE PRACTITIONER

## 2020-08-13 PROCEDURE — G0378 HOSPITAL OBSERVATION PER HR: HCPCS

## 2020-08-13 PROCEDURE — 85730 THROMBOPLASTIN TIME PARTIAL: CPT | Performed by: NURSE PRACTITIONER

## 2020-08-13 PROCEDURE — 80048 BASIC METABOLIC PNL TOTAL CA: CPT | Performed by: NURSE PRACTITIONER

## 2020-08-13 RX ORDER — MULTIVITAMIN,THERAPEUTIC
1 TABLET ORAL DAILY
Status: DISCONTINUED | OUTPATIENT
Start: 2020-08-14 | End: 2020-08-15 | Stop reason: HOSPADM

## 2020-08-13 RX ORDER — CETIRIZINE HYDROCHLORIDE 10 MG/1
10 TABLET ORAL NIGHTLY
Status: DISCONTINUED | OUTPATIENT
Start: 2020-08-13 | End: 2020-08-15 | Stop reason: HOSPADM

## 2020-08-13 RX ORDER — ACETAMINOPHEN 325 MG/1
650 TABLET ORAL EVERY 4 HOURS PRN
Status: DISCONTINUED | OUTPATIENT
Start: 2020-08-13 | End: 2020-08-15 | Stop reason: HOSPADM

## 2020-08-13 RX ORDER — ASPIRIN 81 MG/1
324 TABLET, CHEWABLE ORAL ONCE
Status: COMPLETED | OUTPATIENT
Start: 2020-08-13 | End: 2020-08-13

## 2020-08-13 RX ORDER — DILTIAZEM HYDROCHLORIDE 240 MG/1
240 CAPSULE, COATED, EXTENDED RELEASE ORAL
Status: DISCONTINUED | OUTPATIENT
Start: 2020-08-14 | End: 2020-08-15 | Stop reason: HOSPADM

## 2020-08-13 RX ORDER — SODIUM CHLORIDE 0.9 % (FLUSH) 0.9 %
10 SYRINGE (ML) INJECTION AS NEEDED
Status: DISCONTINUED | OUTPATIENT
Start: 2020-08-13 | End: 2020-08-15 | Stop reason: HOSPADM

## 2020-08-13 RX ORDER — BISACODYL 5 MG/1
5 TABLET, DELAYED RELEASE ORAL DAILY PRN
Status: DISCONTINUED | OUTPATIENT
Start: 2020-08-13 | End: 2020-08-15 | Stop reason: HOSPADM

## 2020-08-13 RX ORDER — ACETAMINOPHEN 650 MG/1
650 SUPPOSITORY RECTAL EVERY 4 HOURS PRN
Status: DISCONTINUED | OUTPATIENT
Start: 2020-08-13 | End: 2020-08-15 | Stop reason: HOSPADM

## 2020-08-13 RX ORDER — ONDANSETRON 4 MG/1
4 TABLET, FILM COATED ORAL EVERY 6 HOURS PRN
Status: DISCONTINUED | OUTPATIENT
Start: 2020-08-13 | End: 2020-08-15 | Stop reason: HOSPADM

## 2020-08-13 RX ORDER — ACETAMINOPHEN 160 MG/5ML
650 SOLUTION ORAL EVERY 4 HOURS PRN
Status: DISCONTINUED | OUTPATIENT
Start: 2020-08-13 | End: 2020-08-15 | Stop reason: HOSPADM

## 2020-08-13 RX ORDER — CHOLECALCIFEROL (VITAMIN D3) 125 MCG
5 CAPSULE ORAL NIGHTLY PRN
Status: DISCONTINUED | OUTPATIENT
Start: 2020-08-13 | End: 2020-08-15 | Stop reason: HOSPADM

## 2020-08-13 RX ORDER — ONDANSETRON 2 MG/ML
4 INJECTION INTRAMUSCULAR; INTRAVENOUS EVERY 6 HOURS PRN
Status: DISCONTINUED | OUTPATIENT
Start: 2020-08-13 | End: 2020-08-15 | Stop reason: HOSPADM

## 2020-08-13 RX ORDER — ALUMINA, MAGNESIA, AND SIMETHICONE 2400; 2400; 240 MG/30ML; MG/30ML; MG/30ML
15 SUSPENSION ORAL EVERY 6 HOURS PRN
Status: DISCONTINUED | OUTPATIENT
Start: 2020-08-13 | End: 2020-08-15 | Stop reason: HOSPADM

## 2020-08-13 RX ORDER — HYDROCODONE BITARTRATE AND ACETAMINOPHEN 5; 325 MG/1; MG/1
1 TABLET ORAL ONCE AS NEEDED
Status: DISCONTINUED | OUTPATIENT
Start: 2020-08-13 | End: 2020-08-15 | Stop reason: HOSPADM

## 2020-08-13 RX ORDER — ATORVASTATIN CALCIUM 10 MG/1
10 TABLET, FILM COATED ORAL DAILY
Status: DISCONTINUED | OUTPATIENT
Start: 2020-08-14 | End: 2020-08-15 | Stop reason: HOSPADM

## 2020-08-13 RX ADMIN — APIXABAN 2.5 MG: 2.5 TABLET, FILM COATED ORAL at 22:40

## 2020-08-13 RX ADMIN — CETIRIZINE HYDROCHLORIDE 10 MG: 10 TABLET, FILM COATED ORAL at 22:41

## 2020-08-13 RX ADMIN — ASPIRIN 81 MG 324 MG: 81 TABLET ORAL at 18:24

## 2020-08-13 RX ADMIN — HYDROCODONE BITARTRATE AND ACETAMINOPHEN 1 TABLET: 5; 325 TABLET ORAL at 19:20

## 2020-08-13 RX ADMIN — NITROGLYCERIN 1 INCH: 20 OINTMENT TOPICAL at 19:20

## 2020-08-13 NOTE — ED PROVIDER NOTES
Subjective   Chief complaint: Left shoulder pain      Context: Patient is an 84-year-old female who comes in complaining of left shoulder pain.  She states she had an episode 5 days ago when she was sitting at rest and had not done any activity through the day and it resolved on its own.  She states that she was mowing the grass today when it started hurting again and has not resolved.  She initially stated that range of motion does not reproduce the pain but then said it does seem to make it worse.  She denies any shortness of breath swelling in her legs or feet.  She denies any nausea.  She states she was diaphoretic but she was also outside cutting the grass.  She states she has a family history with both of her brothers having heart stents before the age of 65.  She has never had a heart cath or stress test.    Duration: Sunday, resolved, again today    Timing: waxes and wanes    Severity: moderate    Associated symptoms: denies          PCP: adal Woodward      LNMP: Postmenopausal            Review of Systems   Constitutional: Negative for fever.   HENT: Negative.    Respiratory: Negative.    Cardiovascular: Negative for palpitations and leg swelling.   Gastrointestinal: Negative.    Genitourinary: Negative.    Musculoskeletal: Positive for arthralgias.   Skin: Negative.    Neurological: Negative.    Hematological: Bruises/bleeds easily.   Psychiatric/Behavioral: Negative for confusion.       Past Medical History:   Diagnosis Date   • A-fib (CMS/[facility])    • JIGAR (acute kidney injury) (CMS/[facility])    • GERD (gastroesophageal reflux disease)    • Glaucoma    • History of echocardiogram 11/04/2019    Moderate MD, Mild MR/TR, LA moderate-severely dilated, EF 61-65%   • Hyperlipidemia    • Hypertension    • Osteoarthritis        No Known Allergies    History reviewed. No pertinent surgical history.    Family History   Problem Relation Age of Onset   • No Known Problems Mother    • No Known Problems Father    • No Known  Problems Brother        Social History     Socioeconomic History   • Marital status:      Spouse name: Not on file   • Number of children: Not on file   • Years of education: Not on file   • Highest education level: Not on file   Tobacco Use   • Smoking status: Never Smoker   • Smokeless tobacco: Never Used   Substance and Sexual Activity   • Alcohol use: No     Frequency: Never   • Drug use: No   • Sexual activity: Defer           Objective   Physical Exam     Vital signs and triage nurse note reviewed.   Constitutional: Awake, alert; well-developed and well-nourished.    HEENT: Normocephalic, atraumatic; pupils are PERRL with intact EOM; oropharynx is pink and moist without exudate or erythema.   Neck: Supple, full range of motion without pain;    Cardiovascular: Regularly irregular rate and rhythm, normal S1-S2.  Faint murmur   Pulmonary: Respiratory effort regular nonlabored, breath sounds clear to auscultation all fields.   Abdomen: Soft, nontender nondistended with normoactive bowel sounds; no rebound or guarding.   Musculoskeletal: Independent range of motion of all extremities.  There is no swelling erythema ecchymosis abrasions or obvious signs of trauma noted to the left shoulder.  Mild pain with range of motion.   Neuro: Alert oriented x3, speech is clear and appropriate, GCS 15   Skin:  Fleshtone warm, dry, intact; no erythematous or petechial rash or lesion       ECG 12 Lead    Date/Time: 8/13/2020 5:48 PM  Performed by: Elsa Evangelista APRN  Authorized by: Elsa Evangelista APRN   Interpreted by physician ([place])  Comparison: compared with previous ECG from 11/20/2019  Comparison to previous ECG: A. fib rate of 90  Rhythm: sinus rhythm  BPM: 62          Labs Reviewed   BASIC METABOLIC PANEL - Abnormal; Notable for the following components:       Result Value    Glucose 125 (*)     Creatinine 1.11 (*)     Sodium 133 (*)     Chloride 95 (*)     eGFR Non  Amer 47 (*)     All other  components within normal limits    Narrative:     GFR Normal >60  Chronic Kidney Disease <60  Kidney Failure <15     APTT - Abnormal; Notable for the following components:    PTT 32.1 (*)     All other components within normal limits   CBC WITH AUTO DIFFERENTIAL - Abnormal; Notable for the following components:    Neutrophil % 76.9 (*)     Lymphocyte % 17.2 (*)     Monocyte % 4.9 (*)     Neutrophils, Absolute 8.00 (*)     All other components within normal limits   PROTIME-INR - Normal   BNP (IN-HOUSE) - Normal    Narrative:     Among patients with dyspnea, NT-proBNP is highly sensitive for the detection of acute congestive heart failure. In addition NT-proBNP of <300 pg/ml effectively rules out acute congestive heart failure with 99% negative predictive value.    Results may be falsely decreased if patient taking Biotin.     TROPONIN (IN-HOUSE) - Normal    Narrative:     Troponin T Reference Range:  <= 0.03 ng/mL-   Negative for AMI  >0.03 ng/mL-     Abnormal for myocardial necrosis.  Clinicians would have to utilize clinical acumen, EKG, Troponin and serial changes to determine if it is an Acute Myocardial Infarction or myocardial injury due to an underlying chronic condition.       Results may be falsely decreased if patient taking Biotin.     BUN - Normal   CBC AND DIFFERENTIAL    Narrative:     The following orders were created for panel order CBC & Differential.  Procedure                               Abnormality         Status                     ---------                               -----------         ------                     CBC Auto Differential[[id number]]        Abnormal            Final result                 Please view results for these tests on the individual orders.     Medications   sodium chloride 0.9 % flush 10 mL (has no administration in time range)   HYDROcodone-acetaminophen (NORCO) 5-325 MG per tablet 1 tablet (has no administration in time range)   nitroglycerin (NITROSTAT) ointment 1  inch (has no administration in time range)   aspirin chewable tablet 324 mg (324 mg Oral Given 8/13/20 1824)     Xr Shoulder 2+ View Left    Result Date: 8/13/2020    1.  Widening of the subacromial space and apparent inferior subluxation of the humeral head relative to the glenoid.  There is suggestion of a large joint effusion.  No acute fracture lines are seen.  Electronically Signed By-Zurdo Villagomez On:8/13/2020 6:36 PM This report was finalized on [id number] by  Zurdo Villagomez, .    Xr Chest 1 View    Result Date: 8/13/2020   1. No acute cardiopulmonary disease.   Electronically Signed By-Zurdo Villagomez On:8/13/2020 6:27 PM This report was finalized on [id number] by  Zurdo Villagomez, .               ED Course                                           MDM  Number of Diagnoses or Management Options  Chest pain, unspecified type:   Diagnosis management comments: Chart review:  11/2019: admitted new onset afib with RVR, neg serial troponins  · TTE Moderate pulmonic valve regurgitation is present.  · Left atrial cavity size is moderate-to-severely dilated.  · Mild mitral valve regurgitation is present  · Mild tricuspid valve regurgitation is present.  · Left ventricular systolic function is normal.        Comorbidities:  has a past medical history of A-fib (CMS/[facility]), JIGAR (acute kidney injury) (CMS/[facility]), GERD (gastroesophageal reflux disease), Glaucoma, History of echocardiogram (11/04/2019), Hyperlipidemia, Hypertension, and Osteoarthritis.  Differentials: STEMI non-STEMI arthritis angina   not all inclusive of differentials considered  Discussion with provider: Hospitalist  Radiology interpretation:  X-rays reviewed by me and interpreted by radiologist,   Xr Shoulder 2+ View Left    Result Date: 8/13/2020    1.  Widening of the subacromial space and apparent inferior subluxation of the humeral head relative to the glenoid.  There is suggestion of a large joint effusion.  No acute fracture lines are seen.   Electronically Signed By-Zurdo Villagomez On:8/13/2020 6:36 PM This report was finalized on [id number] by  Zurdo Villagomez, .    Xr Chest 1 View    Result Date: 8/13/2020   1. No acute cardiopulmonary disease.   Electronically Signed By-Zurdo Villagomez On:8/13/2020 6:27 PM This report was finalized on [id number] by  Zurdo Villagomez, .    Lab interpretation:  Labs viewed by me significant for, creatinine 1.1    Appropriate PPE worn during exam.  Patient was given an aspirin and Nitropaste and Norco.  I discussed findings with patient.  Her reported history of nonreproducible shoulder pain was concerning for atypical cardiac disease.  She has a moderate heart score.  We discussed her symptoms could be musculoskeletal but would need cardiac rule out.  I discussed with Dr. Barron.  Will be admitted to the hospitalist.    i discussed findings with patient who voices understanding of admission      Final diagnoses:   Chest pain, unspecified type            Elsa Evangelista, APRN  08/13/20 1907

## 2020-08-13 NOTE — ED NOTES
"Patient stated she was outside cutting grass and she began having left shoulder pain. Patient denied any nausea or vomiting. Patient denied any chest pain. Patient stated she doesn't know what made the pain occur. Patient stated she \"can't move it too well.\" which is different than normal for her.      Nicolas Stuart RN  08/13/20 1924    "

## 2020-08-14 LAB
ANION GAP SERPL CALCULATED.3IONS-SCNC: 12 MMOL/L (ref 5–15)
BASOPHILS # BLD AUTO: 0 10*3/MM3 (ref 0–0.2)
BASOPHILS NFR BLD AUTO: 0.3 % (ref 0–1.5)
BUN SERPL-MCNC: 15 MG/DL (ref 8–23)
BUN SERPL-MCNC: ABNORMAL MG/DL
BUN/CREAT SERPL: ABNORMAL
CALCIUM SPEC-SCNC: 9.6 MG/DL (ref 8.6–10.5)
CHLORIDE SERPL-SCNC: 94 MMOL/L (ref 98–107)
CO2 SERPL-SCNC: 27 MMOL/L (ref 22–29)
CREAT SERPL-MCNC: 0.96 MG/DL (ref 0.57–1)
DEPRECATED RDW RBC AUTO: 42.4 FL (ref 37–54)
EOSINOPHIL # BLD AUTO: 0 10*3/MM3 (ref 0–0.4)
EOSINOPHIL NFR BLD AUTO: 0.1 % (ref 0.3–6.2)
ERYTHROCYTE [DISTWIDTH] IN BLOOD BY AUTOMATED COUNT: 13.3 % (ref 12.3–15.4)
GFR SERPL CREATININE-BSD FRML MDRD: 55 ML/MIN/1.73
GLUCOSE SERPL-MCNC: 124 MG/DL (ref 65–99)
HCT VFR BLD AUTO: 37.6 % (ref 34–46.6)
HGB BLD-MCNC: 13 G/DL (ref 12–15.9)
LYMPHOCYTES # BLD AUTO: 0.9 10*3/MM3 (ref 0.7–3.1)
LYMPHOCYTES NFR BLD AUTO: 9.6 % (ref 19.6–45.3)
MCH RBC QN AUTO: 31.8 PG (ref 26.6–33)
MCHC RBC AUTO-ENTMCNC: 34.7 G/DL (ref 31.5–35.7)
MCV RBC AUTO: 91.9 FL (ref 79–97)
MONOCYTES # BLD AUTO: 0.4 10*3/MM3 (ref 0.1–0.9)
MONOCYTES NFR BLD AUTO: 3.7 % (ref 5–12)
NEUTROPHILS NFR BLD AUTO: 8.3 10*3/MM3 (ref 1.7–7)
NEUTROPHILS NFR BLD AUTO: 86.3 % (ref 42.7–76)
NRBC BLD AUTO-RTO: 0 /100 WBC (ref 0–0.2)
PLATELET # BLD AUTO: 212 10*3/MM3 (ref 140–450)
PMV BLD AUTO: 7.3 FL (ref 6–12)
POTASSIUM SERPL-SCNC: 3.7 MMOL/L (ref 3.5–5.2)
RBC # BLD AUTO: 4.1 10*6/MM3 (ref 3.77–5.28)
SODIUM SERPL-SCNC: 133 MMOL/L (ref 136–145)
TROPONIN T SERPL-MCNC: <0.01 NG/ML (ref 0–0.03)
TSH SERPL DL<=0.05 MIU/L-ACNC: 1.6 UIU/ML (ref 0.27–4.2)
WBC # BLD AUTO: 9.7 10*3/MM3 (ref 3.4–10.8)

## 2020-08-14 PROCEDURE — G0378 HOSPITAL OBSERVATION PER HR: HCPCS

## 2020-08-14 PROCEDURE — 84484 ASSAY OF TROPONIN QUANT: CPT | Performed by: NURSE PRACTITIONER

## 2020-08-14 PROCEDURE — 85025 COMPLETE CBC W/AUTO DIFF WBC: CPT | Performed by: NURSE PRACTITIONER

## 2020-08-14 PROCEDURE — 99225 PR SBSQ OBSERVATION CARE/DAY 25 MINUTES: CPT | Performed by: HOSPITALIST

## 2020-08-14 PROCEDURE — 80048 BASIC METABOLIC PNL TOTAL CA: CPT | Performed by: NURSE PRACTITIONER

## 2020-08-14 RX ORDER — HYDROCODONE BITARTRATE AND ACETAMINOPHEN 5; 325 MG/1; MG/1
1 TABLET ORAL EVERY 6 HOURS PRN
Status: DISCONTINUED | OUTPATIENT
Start: 2020-08-14 | End: 2020-08-15 | Stop reason: HOSPADM

## 2020-08-14 RX ADMIN — DILTIAZEM HYDROCHLORIDE 240 MG: 240 CAPSULE, COATED, EXTENDED RELEASE ORAL at 09:37

## 2020-08-14 RX ADMIN — Medication 10 ML: at 09:37

## 2020-08-14 RX ADMIN — ATORVASTATIN CALCIUM 10 MG: 10 TABLET, FILM COATED ORAL at 09:37

## 2020-08-14 RX ADMIN — LISINOPRIL: 20 TABLET ORAL at 09:37

## 2020-08-14 RX ADMIN — APIXABAN 2.5 MG: 2.5 TABLET, FILM COATED ORAL at 09:37

## 2020-08-14 RX ADMIN — CETIRIZINE HYDROCHLORIDE 10 MG: 10 TABLET, FILM COATED ORAL at 21:07

## 2020-08-14 RX ADMIN — THERA TABS 1 TABLET: TAB at 09:37

## 2020-08-14 RX ADMIN — HYDROCODONE BITARTRATE AND ACETAMINOPHEN 1 TABLET: 5; 325 TABLET ORAL at 01:11

## 2020-08-14 RX ADMIN — ACETAMINOPHEN 650 MG: 325 TABLET, FILM COATED ORAL at 05:19

## 2020-08-14 NOTE — PROGRESS NOTES
[facility] Medicine Services Daily Progress Note      Hospitalist Team  LOS 0 days      Patient Care Team:  Rikki Woodward MD as PCP - General (Family Medicine)  Ricky York MD as Consulting Physician (Cardiology)    Patient Location: 246/1      Subjective   Subjective   Complaining of very significant pain in left shoulder, says may be improved little bit..  Chief Complaint / Subjective  Chief Complaint   Patient presents with   • Shoulder Pain         Brief Synopsis of Hospital Course/HPI    Ms. Snyder is a 84 y.o. female with a history of atrial fibrillation, GERD, hypertension and hyperlipidemia presented to the [facility] ED on 8/13/2020 with a complaint of left shoulder pain.  Patient states she was mowing her lawn today when her left shoulder began to hurt.  Patient states she had a similar episode a 5 days ago but her pain resolved with rest.  Patient states she is unable to raise her left arm without pain.  Patient states pain is sharp, constant, 5 out of 10, and was made better with pain medicine.  Patient denies fall, trauma to left shoulder.  Patient denies chest pain, shortness of breath, and diaphoresis.  Patient does have a history of atrial fibrillation, and a family history of both of her brothers having heart stents before the age of 65.  Patient has never had a cardiac work-up.     In the ED, troponin negative, proBNP 171.4, glucose 125, sodium 133, potassium 3.9, BUN 14, creatinine 1.11, WBC 10.4, Hgb 14.6, HCT 42.9, platelets 234.  Chest x-ray no acute cardiopulmonary disease.  EKG: Normal sinus rhythm.  Left shoulder x-ray: Widening of the subacromial space and apparent inferior subluxation of the humeral head.  There is suggestion of a large joint effusion.  No acute fracture seen.    Date::          ROS      Objective   Objective      Vital Signs  Temp:  [98.1 °F (36.7 °C)-98.3 °F (36.8 °C)] 98.3 °F (36.8 °C)  Heart Rate:  [59-83]  "83  Resp:  [16-20] 16  BP: (141-204)/(48-77) 159/75  Oxygen Therapy  SpO2: 94 %  Pulse Oximetry Type: Intermittent  Device (Oxygen Therapy): room air  Device (Oxygen Therapy): room air  Flowsheet Rows      First Filed Value   Admission Height  160 cm (63\") Documented at 08/13/2020 1726   Admission Weight  67.7 kg (149 lb 4 oz) Documented at 08/13/2020 1726        Intake & Output (last 3 days)       08/11 0701 - 08/12 0700 08/12 0701 - 08/13 0700 08/13 0701 - 08/14 0700 08/14 0701 - 08/15 0700    P.O.    240    Total Intake(mL/kg)    240 (3.4)    Net    +240                Lines, Drains & Airways    Active LDAs     Name:   Placement date:   Placement time:   Site:   Days:    Peripheral IV 08/13/20 1750 Left Antecubital   08/13/20    1750    Antecubital   less than 1                  Physical Exam:    Physical Exam   Constitutional: She is oriented to person, place, and time. She appears well-developed and well-nourished. No distress.   HENT:   Head: Normocephalic and atraumatic.   Right Ear: External ear normal.   Left Ear: External ear normal.   Nose: Nose normal.   Mouth/Throat: Oropharynx is clear and moist. No oropharyngeal exudate.   Eyes: Pupils are equal, round, and reactive to light. Conjunctivae and EOM are normal. Right eye exhibits no discharge. Left eye exhibits no discharge. No scleral icterus.   Neck: Normal range of motion. No JVD present. No tracheal deviation present. No thyromegaly present.   Cardiovascular: Normal rate, regular rhythm, normal heart sounds and intact distal pulses. Exam reveals no gallop and no friction rub.   No murmur heard.  Pulmonary/Chest: Effort normal and breath sounds normal. No stridor. No respiratory distress. She has no wheezes. She has no rales. She exhibits no tenderness.   Abdominal: Soft. Bowel sounds are normal. She exhibits no distension and no mass. There is no tenderness. There is no rebound and no guarding. No hernia.   Musculoskeletal: Normal range of motion. " She exhibits no edema or tenderness.   Positive for left shoulder pain.   Lymphadenopathy:     She has no cervical adenopathy.   Neurological: She is alert and oriented to person, place, and time. No cranial nerve deficit or sensory deficit. She exhibits normal muscle tone. Coordination normal.   Skin: Skin is warm and dry. No rash noted. She is not diaphoretic. No erythema.   Psychiatric: She has a normal mood and affect. Her behavior is normal.   Nursing note and vitals reviewed.            Procedures:              Results Review:     I reviewed the patient's new clinical results.      Lab Results (last 24 hours)     Procedure Component Value Units Date/Time    BUN [[id number]]  (Normal) Collected:  08/14/20 0415    Specimen:  Blood Updated:  08/14/20 0811     BUN 15 mg/dL     TSH [[id number]]  (Normal) Collected:  08/13/20 2240    Specimen:  Blood Updated:  08/14/20 0634     TSH 1.600 uIU/mL     Troponin [[id number]]  (Normal) Collected:  08/14/20 0415    Specimen:  Blood Updated:  08/14/20 0536     Troponin T <0.010 ng/mL     Narrative:       Troponin T Reference Range:  <= 0.03 ng/mL-   Negative for AMI  >0.03 ng/mL-     Abnormal for myocardial necrosis.  Clinicians would have to utilize clinical acumen, EKG, Troponin and serial changes to determine if it is an Acute Myocardial Infarction or myocardial injury due to an underlying chronic condition.       Results may be falsely decreased if patient taking Biotin.      Basic Metabolic Panel [[id number]]  (Abnormal) Collected:  08/14/20 0415    Specimen:  Blood Updated:  08/14/20 0536     Glucose 124 mg/dL      BUN --     Comment: Testing performed by alternate method        Creatinine 0.96 mg/dL      Sodium 133 mmol/L      Potassium 3.7 mmol/L      Chloride 94 mmol/L      CO2 27.0 mmol/L      Calcium 9.6 mg/dL      eGFR Non African Amer 55 mL/min/1.73      BUN/Creatinine Ratio --     Comment: Testing not performed        Anion Gap 12.0 mmol/L     Narrative:       GFR  Normal >60  Chronic Kidney Disease <60  Kidney Failure <15      CBC Auto Differential [[id number]]  (Abnormal) Collected:  08/14/20 0415    Specimen:  Blood Updated:  08/14/20 0446     WBC 9.70 10*3/mm3      RBC 4.10 10*6/mm3      Hemoglobin 13.0 g/dL      Hematocrit 37.6 %      MCV 91.9 fL      MCH 31.8 pg      MCHC 34.7 g/dL      RDW 13.3 %      RDW-SD 42.4 fl      MPV 7.3 fL      Platelets 212 10*3/mm3      Neutrophil % 86.3 %      Lymphocyte % 9.6 %      Monocyte % 3.7 %      Eosinophil % 0.1 %      Basophil % 0.3 %      Neutrophils, Absolute 8.30 10*3/mm3      Lymphocytes, Absolute 0.90 10*3/mm3      Monocytes, Absolute 0.40 10*3/mm3      Eosinophils, Absolute 0.00 10*3/mm3      Basophils, Absolute 0.00 10*3/mm3      nRBC 0.0 /100 WBC     Troponin [[id number]]  (Normal) Collected:  08/13/20 2240    Specimen:  Blood Updated:  08/13/20 2322     Troponin T <0.010 ng/mL     Narrative:       Troponin T Reference Range:  <= 0.03 ng/mL-   Negative for AMI  >0.03 ng/mL-     Abnormal for myocardial necrosis.  Clinicians would have to utilize clinical acumen, EKG, Troponin and serial changes to determine if it is an Acute Myocardial Infarction or myocardial injury due to an underlying chronic condition.       Results may be falsely decreased if patient taking Biotin.      Lipid Panel [[id number]]  (Abnormal) Collected:  08/13/20 2240    Specimen:  Blood Updated:  08/13/20 2322     Total Cholesterol 160 mg/dL      Triglycerides 71 mg/dL      HDL Cholesterol 61 mg/dL      LDL Cholesterol  85 mg/dL      VLDL Cholesterol 14.2 mg/dL      LDL/HDL Ratio 1.39    Narrative:       Cholesterol Reference Ranges  (U.S. Department of Health and Human Services ATP III Classifications)    Desirable          <200 mg/dL  Borderline High    200-239 mg/dL  High Risk          >240 mg/dL      Triglyceride Reference Ranges  (U.S. Department of Health and Human Services ATP III Classifications)    Normal           <150 mg/dL  Borderline High   150-199 mg/dL  High             200-499 mg/dL  Very High        >500 mg/dL    HDL Reference Ranges  (U.S. Department of Health and Human Services ATP III Classifcations)    Low     <40 mg/dl (major risk factor for CHD)  High    >60 mg/dl ('negative' risk factor for CHD)        LDL Reference Ranges  (U.S. Department of Health and Human Services ATP III Classifcations)    Optimal          <100 mg/dL  Near Optimal     100-129 mg/dL  Borderline High  130-159 mg/dL  High             160-189 mg/dL  Very High        >189 mg/dL    BUN [[id number]]  (Normal) Collected:  08/13/20 1750    Specimen:  Blood Updated:  08/13/20 1834     BUN 14 mg/dL     Basic Metabolic Panel [[id number]]  (Abnormal) Collected:  08/13/20 1750    Specimen:  Blood Updated:  08/13/20 1828     Glucose 125 mg/dL      BUN --     Comment: Testing performed by alternate method        Creatinine 1.11 mg/dL      Sodium 133 mmol/L      Potassium 3.9 mmol/L      Chloride 95 mmol/L      CO2 24.0 mmol/L      Calcium 10.5 mg/dL      eGFR Non African Amer 47 mL/min/1.73      BUN/Creatinine Ratio --     Comment: Testing not performed        Anion Gap 14.0 mmol/L     Narrative:       GFR Normal >60  Chronic Kidney Disease <60  Kidney Failure <15      Troponin [[id number]]  (Normal) Collected:  08/13/20 1750    Specimen:  Blood Updated:  08/13/20 1828     Troponin T <0.010 ng/mL     Narrative:       Troponin T Reference Range:  <= 0.03 ng/mL-   Negative for AMI  >0.03 ng/mL-     Abnormal for myocardial necrosis.  Clinicians would have to utilize clinical acumen, EKG, Troponin and serial changes to determine if it is an Acute Myocardial Infarction or myocardial injury due to an underlying chronic condition.       Results may be falsely decreased if patient taking Biotin.      BNP [[id number]]  (Normal) Collected:  08/13/20 1750    Specimen:  Blood Updated:  08/13/20 1826     proBNP 171.4 pg/mL     Narrative:       Among patients with dyspnea, NT-proBNP is highly sensitive  for the detection of acute congestive heart failure. In addition NT-proBNP of <300 pg/ml effectively rules out acute congestive heart failure with 99% negative predictive value.    Results may be falsely decreased if patient taking Biotin.      Protime-INR [[id number]]  (Normal) Collected:  08/13/20 1750    Specimen:  Blood Updated:  08/13/20 1816     Protime 10.9 Seconds      INR 1.00    aPTT [[id number]]  (Abnormal) Collected:  08/13/20 1750    Specimen:  Blood Updated:  08/13/20 1816     PTT 32.1 seconds     CBC & Differential [[id number]] Collected:  08/13/20 1750    Specimen:  Blood Updated:  08/13/20 1800    Narrative:       The following orders were created for panel order CBC & Differential.  Procedure                               Abnormality         Status                     ---------                               -----------         ------                     CBC Auto Differential[[id number]]        Abnormal            Final result                 Please view results for these tests on the individual orders.    CBC Auto Differential [[id number]]  (Abnormal) Collected:  08/13/20 1750    Specimen:  Blood Updated:  08/13/20 1800     WBC 10.40 10*3/mm3      RBC 4.69 10*6/mm3      Hemoglobin 14.6 g/dL      Hematocrit 42.9 %      MCV 91.6 fL      MCH 31.2 pg      MCHC 34.0 g/dL      RDW 13.1 %      RDW-SD 42.0 fl      MPV 7.1 fL      Platelets 234 10*3/mm3      Neutrophil % 76.9 %      Lymphocyte % 17.2 %      Monocyte % 4.9 %      Eosinophil % 0.4 %      Basophil % 0.6 %      Neutrophils, Absolute 8.00 10*3/mm3      Lymphocytes, Absolute 1.80 10*3/mm3      Monocytes, Absolute 0.50 10*3/mm3      Eosinophils, Absolute 0.00 10*3/mm3      Basophils, Absolute 0.10 10*3/mm3      nRBC 0.0 /100 WBC         No results found for: HGBA1C  Results from last 7 days   Lab Units 08/13/20 1750   INR  1.00           No results found for: LIPASE  Lab Results   Component Value Date    CHOL 160 08/13/2020    TRIG 71 08/13/2020     HDL 61 (H) 08/13/2020    LDL 85 08/13/2020       No results found for: INTRAOP, PREDX, FINALDX, COMDX    Microbiology Results (last 10 days)     ** No results found for the last 240 hours. **          ECG/EMG Results (most recent)     Procedure Component Value Units Date/Time    ECG 12 Lead [[id number]] Collected:  08/13/20 1744     Updated:  08/13/20 1747    Narrative:       HEART RATE= 62  bpm  RR Interval= 972  ms  [place] Interval= 133  ms  P Horizontal Axis= 14  deg  P Front Axis= 24  deg  QRSD Interval= 101  ms  QT Interval= 404  ms  QRS Axis= 37  deg  T Wave Axis= 2  deg  - NORMAL ECG -  Sinus rhythm  Electronically Signed By:   Date and Time of Study: 2020-08-13 17:44:50    ECG 12 Lead [[id number]] Resulted:  08/13/20 1907     Updated:  08/13/20 1907               Results for orders placed during the hospital encounter of 11/02/19   Adult Transthoracic Echo Complete With Contrast if Necessary Per Protocol    Narrative · Moderate pulmonic valve regurgitation is present.  · Left atrial cavity size is moderate-to-severely dilated.  · Mild mitral valve regurgitation is present  · Mild tricuspid valve regurgitation is present.  · Left ventricular systolic function is normal.          Xr Shoulder 2+ View Left    Result Date: 8/13/2020    1.  Widening of the subacromial space and apparent inferior subluxation of the humeral head relative to the glenoid.  There is suggestion of a large joint effusion.  No acute fracture lines are seen.  Electronically Signed By-Zurdo Villagomez On:8/13/2020 6:36 PM This report was finalized on [id number] by  Zurdo Villagomez, .    Xr Chest 1 View    Result Date: 8/13/2020   1. No acute cardiopulmonary disease.   Electronically Signed By-Zurdo Villagomez On:8/13/2020 6:27 PM This report was finalized on [id number] by  Zurdo Villagomez .          Xrays, labs reviewed personally by physician.    Medication Review:   I have reviewed the patient's current medication list      Scheduled  Meds    atorvastatin 10 mg Oral Daily   cetirizine 10 mg Oral Nightly   dilTIAZem  mg Oral Q24H   lisinopril-hydroCHLOROthiazide (ZESTORTIC) 20-12.5 mg combo dose  Oral Q24H   Thera 1 tablet Oral Daily       Meds Infusions       Meds PRN  •  acetaminophen **OR** acetaminophen **OR** acetaminophen  •  aluminum-magnesium hydroxide-simethicone  •  bisacodyl  •  HYDROcodone-acetaminophen  •  HYDROcodone-acetaminophen  •  magnesium hydroxide  •  melatonin  •  ondansetron **OR** ondansetron  •  [COMPLETED] Insert peripheral IV **AND** sodium chloride        Assessment/Plan   Assessment/Plan     Active Hospital Problems    Diagnosis  POA   • **Acute pain of left shoulder [M25.512]  Yes   • Chest pain [R07.9]  Yes   • Chronic renal insufficiency, stage III (moderate) (CMS/HCC) [N18.3]  Yes   • Hyperlipidemia [E78.5]  Yes   • GERD (gastroesophageal reflux disease) [K21.9]  Yes   • Hypertension [I10]  Yes   • Paroxysmal atrial fibrillation (CMS/HCC) [I48.0]  Yes      Resolved Hospital Problems   No resolved problems to display.       MEDICAL DECISION MAKING COMPLEXITY BY PROBLEM:     Chest pain atypical referred from shoulder pain.   -Initial troponin negative, trend serial troponins  -cardiac monitor  -EKG for rhythm changes or chest pain  -Echo dated 11/4/2019: Left ventricular systolic function normal, moderate pulmonic valve regurgitation, mild mitral valve regurgitation, mild tricuspid valve regurgitation.  -Check TSH, lipids     Left shoulder pain, x.ray shoulder revealed effusion and widening of joint, will consult orthopaedic service, hold eliquis for now.  -Denies injury, trauma  -Pain medicine PRN  -     Chronic kidney disease stage III  -Creatinine 1.11  -Baseline 1.05  -Monitor kidney function     Atrial fibrillation  -Continue Cardizem, on hold Eliquis     Hyperlipidemia  -Continue statin     Hypertension  -Continue lisinopril, HCTZ           VTE Prophylaxis - SCDs    VTE Prophylaxis -   Mechanical Order  History:     None      Pharmalogical Order History:     Ordered     Dose Route Frequency Stop    08/13/20 2139  apixaban (ELIQUIS) tablet 2.5 mg  Status:  Discontinued      2.5 mg PO Every 12 Hours Scheduled 08/14/20 1044            Code Status -   Code Status and Medical Interventions:   Ordered at: 08/13/20 2137     Level Of Support Discussed With:    Patient     Code Status:    CPR     Medical Interventions (Level of Support Prior to Arrest):    Full       This patient has been examined wearing appropriate Personal Protective Equipment and discussed with  08/14/20        Discharge Planning            Electronically signed by Akash Arechiag MD, 08/14/20, 10:44.  [facility] Hospitalist Team

## 2020-08-14 NOTE — H&P
[facility] Medicine Services      Patient Name: Marisol Snyder  : 1935  MRN: [id number]  Primary Care Physician: Rikki Woodward MD  Date of admission: 2020    Patient Care Team:  Rikki Woodward MD as PCP - General (Family Medicine)  Ricky York MD as Consulting Physician (Cardiology)          Subjective   History Present Illness     Chief Complaint:   Chief Complaint   Patient presents with   • Shoulder Pain         Ms. Snyder is a 84 y.o. female with a history of atrial fibrillation, GERD, hypertension and hyperlipidemia presented to the [facility] ED on 2020 with a complaint of left shoulder pain.  Patient states she was mowing her lawn today when her left shoulder began to hurt.  Patient states she had a similar episode a 5 days ago but her pain resolved with rest.  Patient states she is unable to raise her left arm without pain.  Patient states pain is sharp, constant, 5 out of 10, and was made better with pain medicine.  Patient denies fall, trauma to left shoulder.  Patient denies chest pain, shortness of breath, and diaphoresis.  Patient does have a history of atrial fibrillation, and a family history of both of her brothers having heart stents before the age of 65.  Patient has never had a cardiac work-up.     In the ED, troponin negative, proBNP 171.4, glucose 125, sodium 133, potassium 3.9, BUN 14, creatinine 1.11, WBC 10.4, Hgb 14.6, HCT 42.9, platelets 234.  Chest x-ray no acute cardiopulmonary disease.  EKG: Normal sinus rhythm.  Left shoulder x-ray: Widening of the subacromial space and apparent inferior subluxation of the humeral head.  There is suggestion of a large joint effusion.  No acute fracture seen.        Review of Systems   Constitution: Negative.   HENT: Negative.    Eyes: Negative.    Cardiovascular: Negative.    Respiratory: Negative.    Endocrine: Negative.    Hematologic/Lymphatic: Negative.    Skin:  Negative.    Musculoskeletal: Positive for joint pain.   Gastrointestinal: Negative.    Genitourinary: Negative.    Neurological: Negative.    Psychiatric/Behavioral: Negative.    Allergic/Immunologic: Negative.    All other systems reviewed and are negative.          Personal History     Past Medical History:   Past Medical History:   Diagnosis Date   • A-fib (CMS/[facility])    • JIGAR (acute kidney injury) (CMS/[facility])    • GERD (gastroesophageal reflux disease)    • Glaucoma    • History of echocardiogram 11/04/2019    Moderate OH, Mild MR/TR, LA moderate-severely dilated, EF 61-65%   • Hyperlipidemia    • Hypertension    • Osteoarthritis        Surgical History:    History reviewed. No pertinent surgical history.        Family History: family history includes No Known Problems in her brother, father, and mother. Otherwise pertinent FHx was reviewed and unremarkable.     Social History:  reports that she has never smoked. She has never used smokeless tobacco. She reports that she does not drink alcohol or use drugs.      Medications:  Prior to Admission medications    Medication Sig Start Date End Date Taking? Authorizing Provider   acetaminophen (TYLENOL) 325 MG tablet Take 650 mg by mouth 2 (Two) Times a Day.   Yes Sola Saleh MD   apixaban (ELIQUIS) 2.5 MG tablet tablet Take 1 tablet by mouth Every 12 (Twelve) Hours. 11/4/19  Yes Monico Chavez DO   bimatoprost (LUMIGAN) 0.01 % ophthalmic drops Administer 1 drop to both eyes Every Night.   Yes Sola Saleh MD   Calcium Carbonate-Vitamin D 600-200 MG-UNIT tablet Take 1 tablet by mouth 2 (Two) Times a Day.   Yes Sola Saleh MD   cetirizine (ZYRTEC ALLERGY) 10 MG tablet Take 1 tablet by mouth Every Night.   Yes Sola Saleh MD   dilTIAZem CD (CARDIZEM CD) 240 MG 24 hr capsule Take 1 capsule by mouth Daily. 11/5/19  Yes Monico Chavez DO   Lifitegrast (XIIDRA) 5 % solution Apply 1 drop to eye(s) as directed by provider 2 (Two) Times a  Day.   Yes Sola Saleh MD   lisinopril-hydrochlorothiazide (PRINZIDE,ZESTORETIC) 20-12.5 MG per tablet Take 2 tablets by mouth Daily.   Yes Sola Saleh MD   Multiple Vitamins-Minerals (MULTIVITAMIN WITH MINERALS) tablet tablet Take 1 tablet by mouth Daily.   Yes Sola Saleh MD   pravastatin (PRAVACHOL) 40 MG tablet Take 40 mg by mouth Daily.   Yes Sola Saleh MD   Glucosamine-Chondroit-Vit C-Mn (GLUCOSAMINE 1500 COMPLEX) capsule Take 1 capsule by mouth 2 (Two) Times a Day.  8/13/20  Sola Saleh MD   raNITIdine (ZANTAC) 150 MG tablet Take 150 mg by mouth 2 (Two) Times a Day.  8/13/20  Sola Saleh MD       Allergies:  No Known Allergies    Objective   Objective     Vital Signs  Temp:  [98.1 °F (36.7 °C)] 98.1 °F (36.7 °C)  Heart Rate:  [59-64] 63  Resp:  [16-18] 16  BP: (150-204)/(52-72) 150/56  SpO2:  [93 %-96 %] 95 %  on   ;      Body mass index is 26.44 kg/m².    Physical Exam   Constitutional: She is oriented to person, place, and time. She appears well-developed and well-nourished.   HENT:   Head: Normocephalic.   Eyes: Conjunctivae are normal.   Neck: Normal range of motion.   Cardiovascular: Normal rate, regular rhythm, normal heart sounds and intact distal pulses.   Pulmonary/Chest: Effort normal and breath sounds normal.   Abdominal: Soft. Bowel sounds are normal.   Musculoskeletal: She exhibits tenderness.   Left shoulder pain, pain worse with movement   Neurological: She is alert and oriented to person, place, and time.   Skin: Skin is warm and dry.       Results Review:  I have personally reviewed most recent cardiac tracings, lab results and radiology images and interpretations and agree with findings    Results from last 7 days   Lab Units 08/13/20  1750   WBC 10*3/mm3 10.40   HEMOGLOBIN g/dL 14.6   HEMATOCRIT % 42.9   PLATELETS 10*3/mm3 234   INR  1.00     Results from last 7 days   Lab Units 08/13/20  1750   SODIUM mmol/L 133*   POTASSIUM  mmol/L 3.9   CHLORIDE mmol/L 95*   CO2 mmol/L 24.0   BUN  14   CREATININE mg/dL 1.11*   GLUCOSE mg/dL 125*   CALCIUM mg/dL 10.5   TROPONIN T ng/mL <0.010   PROBNP pg/mL 171.4     Estimated Creatinine Clearance: 34.8 mL/min (A) (by C-G formula based on SCr of 1.11 mg/dL (H)).  Brief Urine Lab Results  (Last result in the past 365 days)      Color   Clarity   Blood   Leuk Est   Nitrite   Protein   CREAT   Urine HCG        11/03/19 2230 Yellow Clear Negative Trace Negative Negative               Microbiology Results (last 10 days)     ** No results found for the last 240 hours. **          ECG/EMG Results (most recent)     Procedure Component Value Units Date/Time    ECG 12 Lead [[id number]] Collected:  08/13/20 1744     Updated:  08/13/20 1747    Narrative:       HEART RATE= 62  bpm  RR Interval= 972  ms  IN Interval= 133  ms  P Horizontal Axis= 14  deg  P Front Axis= 24  deg  QRSD Interval= 101  ms  QT Interval= 404  ms  QRS Axis= 37  deg  T Wave Axis= 2  deg  - NORMAL ECG -  Sinus rhythm  Electronically Signed By:   Date and Time of Study: 2020-08-13 17:44:50    ECG 12 Lead [[id number]] Resulted:  08/13/20 1907     Updated:  08/13/20 1907               Results for orders placed during the hospital encounter of 11/02/19   Adult Transthoracic Echo Complete With Contrast if Necessary Per Protocol    Narrative · Moderate pulmonic valve regurgitation is present.  · Left atrial cavity size is moderate-to-severely dilated.  · Mild mitral valve regurgitation is present  · Mild tricuspid valve regurgitation is present.  · Left ventricular systolic function is normal.          Xr Shoulder 2+ View Left    Result Date: 8/13/2020    1.  Widening of the subacromial space and apparent inferior subluxation of the humeral head relative to the glenoid.  There is suggestion of a large joint effusion.  No acute fracture lines are seen.  Electronically Signed By-Zurdo Villagomez On:8/13/2020 6:36 PM This report was finalized on  [id number] by  Zurdo Villagomez, .    Xr Chest 1 View    Result Date: 8/13/2020   1. No acute cardiopulmonary disease.   Electronically Signed By-Zurdo Villagomez On:8/13/2020 6:27 PM This report was finalized on [id number] by  Zurdo Villagomez, .        Estimated Creatinine Clearance: 34.8 mL/min (A) (by C-G formula based on SCr of 1.11 mg/dL (H)).    Assessment/Plan   Assessment/Plan       Active Hospital Problems    Diagnosis  POA   • **Acute pain of left shoulder [M25.512]  Yes   • Chest pain [R07.9]  Yes   • Chronic renal insufficiency, stage III (moderate) (CMS/HCC) [N18.3]  Yes   • Hyperlipidemia [E78.5]  Yes   • GERD (gastroesophageal reflux disease) [K21.9]  Yes   • Hypertension [I10]  Yes   • Paroxysmal atrial fibrillation (CMS/HCC) [I48.0]  Yes      Resolved Hospital Problems   No resolved problems to display.     Chest pain  -Initial troponin negative, trend serial troponins  -cardiac monitor  -EKG for rhythm changes or chest pain  -Echo dated 11/4/2019: Left ventricular systolic function normal, moderate pulmonic valve regurgitation, mild mitral valve regurgitation, mild tricuspid valve regurgitation.  -Check TSH, lipids    Left shoulder pain  -Denies injury, trauma  -Pain medicine PRN  -May benefit from orthopedic consult    Chronic kidney disease stage III  -Creatinine 1.11  -Baseline 1.05  -Monitor kidney function    Atrial fibrillation  -Continue Cardizem, Eliquis    Hyperlipidemia  -Continue statin    Hypertension  -Continue lisinopril, HCTZ        VTE Prophylaxis -   Mechanical Order History:     None      Pharmalogical Order History:     Ordered     Dose Route Frequency Stop    08/13/20 2139  apixaban (ELIQUIS) tablet 2.5 mg      2.5 mg PO Every 12 Hours Scheduled --          CODE STATUS:    Code Status and Medical Interventions:   Ordered at: 08/13/20 2137     Level Of Support Discussed With:    Patient     Code Status:    CPR     Medical Interventions (Level of Support Prior to Arrest):    Full            I discussed the patient's findings and my recommendations with patient.        Electronically signed by MERNA Tomlin, 08/13/20, 9:39 PM.  [facility], [place], [facility]

## 2020-08-14 NOTE — PROGRESS NOTES
Discharge Planning Assessment   Jared     Patient Name: Marisol Snyder  MRN: [id number]  Today's Date: 8/14/2020    Admit Date: 8/13/2020    Discharge Needs Assessment     Row Name 08/14/20 1236       Living Environment    Lives With  alone    Current Living Arrangements  home/apartment/condo    Primary Care Provided by  self    Provides Primary Care For  no one    Able to Return to Prior Arrangements  yes       Resource/Environmental Concerns    Resource/Environmental Concerns  none    Transportation Concerns  car, none       Transition Planning    Patient/Family Anticipates Transition to  home    Patient/Family Anticipated Services at Transition  none    Transportation Anticipated  family or friend will provide       Discharge Needs Assessment    Readmission Within the Last 30 Days  no previous admission in last 30 days    Concerns to be Addressed  denies needs/concerns at this time    Equipment Currently Used at Home  none    Anticipated Changes Related to Illness  none    Equipment Needed After Discharge  none    Current Discharge Risk  lives alone        Discharge Plan     Row Name 08/14/20 1237       Plan    Plan  Anticipate routine home    Patient/Family in Agreement with Plan  yes    Plan Comments  Met with patient at bedside at a distance greater than 6 feet with a mask and goggles. Reports she lives home alone. I with ADLs, still drives. PCP confirmed. No issues affording food or medications. Currently denies any d/c needs or concerns. DC barriers: ortho consult         Expected Discharge Date and Time     Expected Discharge Date Expected Discharge Time    Aug 15, 2020         Demographic Summary     Row Name 08/14/20 1236       General Information    Admission Type  observation    Arrived From  emergency department    Required Notices Provided  Observation Status Notice    Referral Source  admission list    Reason for Consult  discharge planning    Preferred Language  English     Used  During This Interaction  no        Functional Status     Row Name 08/14/20 1236       Functional Status    Usual Activity Tolerance  good    Current Activity Tolerance  moderate       Functional Status, IADL    Medications  independent    Meal Preparation  independent    Housekeeping  independent    Laundry  independent    Shopping  independent       Mental Status    General Appearance WDL  WDL          Patient Forms     Row Name 08/14/20 1238       Patient Forms    Important Message from Medicare ([facility])  -- [place] 8/13 per reg     Patient Observation Letter  Delivered [place] 8/13 per reg             Marisol Cali RN

## 2020-08-14 NOTE — CONSULTS
Orthopaedic Surgery  Consult Note  Dr. CLEMENTINA Oneill” Luis E II  [phone]    HPI:  Patient is a 84 y.o. Not  or  female who presents with a couple days of left shoulder pain.  An x-ray was taken that demonstrated concerns for shoulder effusion.  On exam today, she had recently been given some Tylenol and stated that her shoulder feels much better.  There is currently no concern for infection.  The patient does take Eliquis.  She has pain with attempted overhead motion of the shoulder.    MEDICAL HISTORY  Past Medical History:   Diagnosis Date   • A-fib (CMS/[facility])    • JIGAR (acute kidney injury) (CMS/[facility])    • GERD (gastroesophageal reflux disease)    • Glaucoma    • History of echocardiogram 11/04/2019    Moderate [place], Mild MR/TR, LA moderate-severely dilated, EF 61-65%   • Hyperlipidemia    • Hypertension    • Osteoarthritis    ·   · History reviewed. No pertinent surgical history.  Prior to Admission medications    Medication Sig Start Date End Date Taking? Authorizing Provider   acetaminophen (TYLENOL) 325 MG tablet Take 650 mg by mouth 2 (Two) Times a Day.   Yes Sola Saleh MD   apixaban (ELIQUIS) 2.5 MG tablet tablet Take 1 tablet by mouth Every 12 (Twelve) Hours. 11/4/19  Yes Monico Chavez,    bimatoprost (LUMIGAN) 0.01 % ophthalmic drops Administer 1 drop to both eyes Every Night.   Yes Sola Saleh MD   Calcium Carbonate-Vitamin D 600-200 MG-UNIT tablet Take 1 tablet by mouth 2 (Two) Times a Day.   Yes Sola Saleh MD   cetirizine (ZYRTEC ALLERGY) 10 MG tablet Take 1 tablet by mouth Every Night.   Yes Sola Saleh MD   dilTIAZem CD (CARDIZEM CD) 240 MG 24 hr capsule Take 1 capsule by mouth Daily. 11/5/19  Yes Monico Chavez DO   Lifitegrast (XIIDRA) 5 % solution Apply 1 drop to eye(s) as directed by provider 2 (Two) Times a Day.   Yes Sola Saleh MD   lisinopril-hydrochlorothiazide (PRINZIDE,ZESTORETIC) 20-12.5 MG per tablet Take 2 tablets by mouth  "Daily.   Yes ProviderSola MD   Multiple Vitamins-Minerals (MULTIVITAMIN WITH MINERALS) tablet tablet Take 1 tablet by mouth Daily.   Yes ProviderSola MD   pravastatin (PRAVACHOL) 40 MG tablet Take 40 mg by mouth Daily.   Yes ProviderSola MD   ·   · No Known Allergies  ·   · There is no immunization history on file for this patient.  Social History     Tobacco Use   • Smoking status: Never Smoker   • Smokeless tobacco: Never Used   Substance Use Topics   • Alcohol use: No     Frequency: Never   ·    Social History     Substance and Sexual Activity   Drug Use No   ·     VITALS: /75 (BP Location: Left arm, Patient Position: Lying)   Pulse 83   Temp 98.3 °F (36.8 °C) (Oral)   Resp 16   Ht 160 cm (63\")   Wt 70.5 kg (155 lb 6.4 oz)   SpO2 94%   BMI 27.53 kg/m²  Body mass index is 27.53 kg/m².    PHYSICAL EXAM:   · CONSTITUTIONAL: No acute distress  · LUNGS: Equal chest rise, no shortness of air  · CARDIOVASCULAR: palpable peripheral pulses  · SKIN: no skin lesions in the area examined  · LYMPH: no lymphadenopathy in the area examined  · EXTREMITY: Left Upper Extremity  · Tenderness to Palpation: Minimal tenderness at the shoulder  · Gross Deformity: No Gross Deformity  · Pulses:  Brisk Capillary Refill  · Sensation: Intact to Radial, Median, Ulnar Nerves and grossly throughout extremity  · Motor: 5/5 Radial/Ulnar/Median/AIN/PIN Motor Nerves   · Range of motion: Mildly painful to active range of motion but passive range of motion elicits no pain    RADIOLOGY REVIEW:   Xr Shoulder 2+ View Left    Result Date: 8/13/2020    1.  Widening of the subacromial space and apparent inferior subluxation of the humeral head relative to the glenoid.  There is suggestion of a large joint effusion.  No acute fracture lines are seen.  Electronically Signed By-Zurdo Villagomez On:8/13/2020 6:36 PM This report was finalized on [id number] by  Zurdo Villagomez, .    Xr Chest 1 View    Result Date: " 8/13/2020   1. No acute cardiopulmonary disease.   Electronically Signed By-Zurdo Villagomez On:8/13/2020 6:27 PM This report was finalized on [id number] by  Zurdo Villagomez, .      LABS:   Results for the past 24 hours:   Recent Results (from the past 24 hour(s))   Basic Metabolic Panel    Collection Time: 08/13/20  5:50 PM   Result Value Ref Range    Glucose 125 (H) 65 - 99 mg/dL    BUN      Creatinine 1.11 (H) 0.57 - 1.00 mg/dL    Sodium 133 (L) 136 - 145 mmol/L    Potassium 3.9 3.5 - 5.2 mmol/L    Chloride 95 (L) 98 - 107 mmol/L    CO2 24.0 22.0 - 29.0 mmol/L    Calcium 10.5 8.6 - 10.5 mg/dL    eGFR Non African Amer 47 (L) >60 mL/min/1.73    BUN/Creatinine Ratio      Anion Gap 14.0 5.0 - 15.0 mmol/L   Protime-INR    Collection Time: 08/13/20  5:50 PM   Result Value Ref Range    Protime 10.9 9.6 - 11.7 Seconds    INR 1.00 0.93 - 1.10   aPTT    Collection Time: 08/13/20  5:50 PM   Result Value Ref Range    PTT 32.1 (H) 24.0 - 31.0 seconds   BNP    Collection Time: 08/13/20  5:50 PM   Result Value Ref Range    proBNP 171.4 0.0-1,800.0 pg/mL   Troponin    Collection Time: 08/13/20  5:50 PM   Result Value Ref Range    Troponin T <0.010 0.000 - 0.030 ng/mL   CBC Auto Differential    Collection Time: 08/13/20  5:50 PM   Result Value Ref Range    WBC 10.40 3.40 - 10.80 10*3/mm3    RBC 4.69 3.77 - 5.28 10*6/mm3    Hemoglobin 14.6 12.0 - 15.9 g/dL    Hematocrit 42.9 34.0 - 46.6 %    MCV 91.6 79.0 - 97.0 fL    MCH 31.2 26.6 - 33.0 pg    MCHC 34.0 31.5 - 35.7 g/dL    RDW 13.1 12.3 - 15.4 %    RDW-SD 42.0 37.0 - 54.0 fl    MPV 7.1 6.0 - 12.0 fL    Platelets 234 140 - 450 10*3/mm3    Neutrophil % 76.9 (H) 42.7 - 76.0 %    Lymphocyte % 17.2 (L) 19.6 - 45.3 %    Monocyte % 4.9 (L) 5.0 - 12.0 %    Eosinophil % 0.4 0.3 - 6.2 %    Basophil % 0.6 0.0 - 1.5 %    Neutrophils, Absolute 8.00 (H) 1.70 - 7.00 10*3/mm3    Lymphocytes, Absolute 1.80 0.70 - 3.10 10*3/mm3    Monocytes, Absolute 0.50 0.10 - 0.90 10*3/mm3    Eosinophils,  Absolute 0.00 0.00 - 0.40 10*3/mm3    Basophils, Absolute 0.10 0.00 - 0.20 10*3/mm3    nRBC 0.0 0.0 - 0.2 /100 WBC   BUN    Collection Time: 08/13/20  5:50 PM   Result Value Ref Range    BUN 14 8 - 23 mg/dL   Troponin    Collection Time: 08/13/20 10:40 PM   Result Value Ref Range    Troponin T <0.010 0.000 - 0.030 ng/mL   Lipid Panel    Collection Time: 08/13/20 10:40 PM   Result Value Ref Range    Total Cholesterol 160 0 - 200 mg/dL    Triglycerides 71 0 - 150 mg/dL    HDL Cholesterol 61 (H) 40 - 60 mg/dL    LDL Cholesterol  85 0 - 100 mg/dL    VLDL Cholesterol 14.2 mg/dL    LDL/HDL Ratio 1.39    TSH    Collection Time: 08/13/20 10:40 PM   Result Value Ref Range    TSH 1.600 0.270 - 4.200 uIU/mL   Troponin    Collection Time: 08/14/20  4:15 AM   Result Value Ref Range    Troponin T <0.010 0.000 - 0.030 ng/mL   Basic Metabolic Panel    Collection Time: 08/14/20  4:15 AM   Result Value Ref Range    Glucose 124 (H) 65 - 99 mg/dL    BUN      Creatinine 0.96 0.57 - 1.00 mg/dL    Sodium 133 (L) 136 - 145 mmol/L    Potassium 3.7 3.5 - 5.2 mmol/L    Chloride 94 (L) 98 - 107 mmol/L    CO2 27.0 22.0 - 29.0 mmol/L    Calcium 9.6 8.6 - 10.5 mg/dL    eGFR Non African Amer 55 (L) >60 mL/min/1.73    BUN/Creatinine Ratio      Anion Gap 12.0 5.0 - 15.0 mmol/L   CBC Auto Differential    Collection Time: 08/14/20  4:15 AM   Result Value Ref Range    WBC 9.70 3.40 - 10.80 10*3/mm3    RBC 4.10 3.77 - 5.28 10*6/mm3    Hemoglobin 13.0 12.0 - 15.9 g/dL    Hematocrit 37.6 34.0 - 46.6 %    MCV 91.9 79.0 - 97.0 fL    MCH 31.8 26.6 - 33.0 pg    MCHC 34.7 31.5 - 35.7 g/dL    RDW 13.3 12.3 - 15.4 %    RDW-SD 42.4 37.0 - 54.0 fl    MPV 7.3 6.0 - 12.0 fL    Platelets 212 140 - 450 10*3/mm3    Neutrophil % 86.3 (H) 42.7 - 76.0 %    Lymphocyte % 9.6 (L) 19.6 - 45.3 %    Monocyte % 3.7 (L) 5.0 - 12.0 %    Eosinophil % 0.1 (L) 0.3 - 6.2 %    Basophil % 0.3 0.0 - 1.5 %    Neutrophils, Absolute 8.30 (H) 1.70 - 7.00 10*3/mm3    Lymphocytes, Absolute  "0.90 0.70 - 3.10 10*3/mm3    Monocytes, Absolute 0.40 0.10 - 0.90 10*3/mm3    Eosinophils, Absolute 0.00 0.00 - 0.40 10*3/mm3    Basophils, Absolute 0.00 0.00 - 0.20 10*3/mm3    nRBC 0.0 0.0 - 0.2 /100 WBC   BUN    Collection Time: 08/14/20  4:15 AM   Result Value Ref Range    BUN 15 8 - 23 mg/dL       IMPRESSION:  Patient is a 84 y.o. Not  or  female with left shoulder pain and effusion    PLAN:   · Admited to: Akash Arechiga MD   · Disposition: Plan: Given the fact that the patient feels much better today after giving some Tylenol I would recommend conservative treatment.  No need for acute orthopedic intervention.  Physical therapy would be warranted.    R \"Ulices\" Luis E JOHN MD  Orthopaedic Surgery  [facility]  [phone] - [place] [facility]  [phone] - [place] Office            "

## 2020-08-15 VITALS
HEIGHT: 63 IN | TEMPERATURE: 98 F | RESPIRATION RATE: 16 BRPM | BODY MASS INDEX: 27.54 KG/M2 | WEIGHT: 155.4 LBS | DIASTOLIC BLOOD PRESSURE: 64 MMHG | SYSTOLIC BLOOD PRESSURE: 166 MMHG | OXYGEN SATURATION: 96 % | HEART RATE: 82 BPM

## 2020-08-15 PROCEDURE — 99217 PR OBSERVATION CARE DISCHARGE MANAGEMENT: CPT | Performed by: HOSPITALIST

## 2020-08-15 PROCEDURE — G0378 HOSPITAL OBSERVATION PER HR: HCPCS

## 2020-08-15 RX ADMIN — LISINOPRIL: 20 TABLET ORAL at 08:43

## 2020-08-15 RX ADMIN — DILTIAZEM HYDROCHLORIDE 240 MG: 240 CAPSULE, COATED, EXTENDED RELEASE ORAL at 08:43

## 2020-08-15 RX ADMIN — ACETAMINOPHEN 650 MG: 325 TABLET, FILM COATED ORAL at 08:43

## 2020-08-15 RX ADMIN — THERA TABS 1 TABLET: TAB at 08:43

## 2020-08-15 RX ADMIN — ATORVASTATIN CALCIUM 10 MG: 10 TABLET, FILM COATED ORAL at 08:43

## 2020-08-15 NOTE — DISCHARGE SUMMARY
[facility] Medicine Services  DISCHARGE SUMMARY        Prepared For PCP:  Rikki Woodward MD    Patient Name: Marisol Snyder  : 1935  MRN: [id number]      Date of Admission:   2020    Date of Discharge:  8/15/2020    Length of stay:  LOS: 0 days     Hospital Course     Presenting Problem:   Chest pain, unspecified type [R07.9]      Active Hospital Problems    Diagnosis  POA   • **Acute pain of left shoulder [M25.512]  Yes   • Chest pain [R07.9]  Yes   • Chronic renal insufficiency, stage III (moderate) (CMS/HCC) [N18.3]  Yes   • Hyperlipidemia [E78.5]  Yes   • GERD (gastroesophageal reflux disease) [K21.9]  Yes   • Hypertension [I10]  Yes   • Paroxysmal atrial fibrillation (CMS/[facility]) [I48.0]  Yes      Resolved Hospital Problems   No resolved problems to display.           Hospital Course:  Marisol Snyder is a 84 y.o. female admitted with left shoulder pain, underwent x-rays revealing possible concerning effusion, patient seen by orthopedic service, they advised conservative management and outpatient follow-up.  Her pain does have improved.  Patient also advised to have outpatient physical therapy.  Patient agreeable for discharge    Condition at discharge stable          Recommendation for Outpatient Providers:     Follow up with Family physician in a week time.        Reasons For Change In Medications and Indications for New Medications:        Day of Discharge     HPI:       Vital Signs:   Temp:  [97.5 °F (36.4 °C)-98 °F (36.7 °C)] 98 °F (36.7 °C)  Heart Rate:  [74-88] 82  Resp:  [16-18] 16  BP: (160-177)/(51-64) 166/64     Physical Exam:  Physical Exam   Constitutional: She is oriented to person, place, and time. She appears well-developed and well-nourished. No distress.   HENT:   Head: Normocephalic and atraumatic.   Right Ear: External ear normal.   Left Ear: External ear normal.   Nose: Nose normal.   Mouth/Throat: Oropharynx is clear and moist. No  oropharyngeal exudate.   Eyes: Pupils are equal, round, and reactive to light. Conjunctivae and EOM are normal. Right eye exhibits no discharge. Left eye exhibits no discharge. No scleral icterus.   Neck: Normal range of motion. No JVD present. No tracheal deviation present. No thyromegaly present.   Cardiovascular: Normal rate, regular rhythm, normal heart sounds and intact distal pulses. Exam reveals no gallop and no friction rub.   No murmur heard.  Pulmonary/Chest: Effort normal and breath sounds normal. No stridor. No respiratory distress. She has no wheezes. She has no rales. She exhibits no tenderness.   Abdominal: Soft. Bowel sounds are normal. She exhibits no distension and no mass. There is no tenderness. There is no rebound and no guarding. No hernia.   Musculoskeletal: Normal range of motion. She exhibits no edema, tenderness or deformity.   Lymphadenopathy:     She has no cervical adenopathy.   Neurological: She is alert and oriented to person, place, and time. No cranial nerve deficit or sensory deficit. She exhibits normal muscle tone. Coordination normal.   Skin: Skin is warm and dry. No rash noted. She is not diaphoretic. No erythema.   Psychiatric: She has a normal mood and affect. Her behavior is normal.   Nursing note and vitals reviewed.      Pertinent  and/or Most Recent Results     Results from last 7 days   Lab Units 08/14/20  0415 08/13/20  1750   WBC 10*3/mm3 9.70 10.40   HEMOGLOBIN g/dL 13.0 14.6   HEMATOCRIT % 37.6 42.9   PLATELETS 10*3/mm3 212 234   SODIUM mmol/L 133* 133*   POTASSIUM mmol/L 3.7 3.9   CHLORIDE mmol/L 94* 95*   CO2 mmol/L 27.0 24.0   BUN  15 14   CREATININE mg/dL 0.96 1.11*   GLUCOSE mg/dL 124* 125*   CALCIUM mg/dL 9.6 10.5     Results from last 7 days   Lab Units 08/13/20  1750   PROTIME Seconds 10.9   INR  1.00   APTT seconds 32.1*     Results from last 7 days   Lab Units 08/13/20  2240   CHOLESTEROL mg/dL 160   TRIGLYCERIDES mg/dL 71   HDL CHOL mg/dL 61*     Results  from last 7 days   Lab Units 08/14/20  0415 08/13/20  2240 08/13/20  1750   TSH uIU/mL  --  1.600  --    PROBNP pg/mL  --   --  171.4   TROPONIN T ng/mL <0.010 <0.010 <0.010       Brief Urine Lab Results  (Last result in the past 365 days)      Color   Clarity   Blood   Leuk Est   Nitrite   Protein   CREAT   Urine HCG        11/03/19 2230 Yellow Clear Negative Trace Negative Negative               Microbiology Results Abnormal     None          Xr Shoulder 2+ View Left    Result Date: 8/13/2020  Impression:   1.  Widening of the subacromial space and apparent inferior subluxation of the humeral head relative to the glenoid.  There is suggestion of a large joint effusion.  No acute fracture lines are seen.  Electronically Signed ByAntoinette Villagomez On:8/13/2020 6:36 PM This report was finalized on [id number] by  Zurdo Villagomez, .    Xr Chest 1 View    Result Date: 8/13/2020  Impression:  1. No acute cardiopulmonary disease.   Electronically Signed ByAntoinette Villagomez On:8/13/2020 6:27 PM This report was finalized on [id number] by  Zurdo Villagomez, .                Results for orders placed during the hospital encounter of 11/02/19   Adult Transthoracic Echo Complete With Contrast if Necessary Per Protocol    Narrative · Moderate pulmonic valve regurgitation is present.  · Left atrial cavity size is moderate-to-severely dilated.  · Mild mitral valve regurgitation is present  · Mild tricuspid valve regurgitation is present.  · Left ventricular systolic function is normal.                  Test Results Pending at Discharge        Procedures Performed           Consults:   Consults     Date and Time Order Name Status Description    8/14/2020 1044 Inpatient Orthopedic Surgery Consult Completed             Discharge Details        Discharge Medications      Continue These Medications      Instructions Start Date   acetaminophen 325 MG tablet  Commonly known as:  TYLENOL   650 mg, Oral, 2 Times Daily      apixaban 2.5 MG  tablet tablet  Commonly known as:  ELIQUIS   2.5 mg, Oral, Every 12 Hours Scheduled      bimatoprost 0.01 % ophthalmic drops  Commonly known as:  LUMIGAN   1 drop, Both Eyes, Nightly      Calcium Carbonate-Vitamin D 600-200 MG-UNIT tablet   1 tablet, Oral, 2 Times Daily      dilTIAZem  MG 24 hr capsule  Commonly known as:  CARDIZEM CD   240 mg, Oral, Every 24 Hours Scheduled      lisinopril-hydrochlorothiazide 20-12.5 MG per tablet  Commonly known as:  PRINZIDE,ZESTORETIC   2 tablets, Oral, Daily      multivitamin with minerals tablet tablet   1 tablet, Oral, Daily      pravastatin 40 MG tablet  Commonly known as:  PRAVACHOL   40 mg, Oral, Daily      Xiidra 5 % ophthalmic solution  Generic drug:  Lifitegrast   1 drop, Ophthalmic, 2 Times Daily      ZyrTEC Allergy 10 MG tablet  Generic drug:  cetirizine   1 tablet, Oral, Nightly             No Known Allergies      Discharge Disposition:  Home-Health Care [facility]    Diet:  Hospital:  Diet Order   Procedures   • Diet Regular         Discharge Activity:         CODE STATUS:    Code Status and Medical Interventions:   Ordered at: 08/13/20 2137     Level Of Support Discussed With:    Patient     Code Status:    CPR     Medical Interventions (Level of Support Prior to Arrest):    Full         Follow-up Appointments  Future Appointments   Date Time Provider Department Center   9/9/2020  9:30 AM Ricky York MD [facility] None             Condition on Discharge:      Stable      This patient has been examined wearing appropriate Personal Protective Equipment and discussed with hospital infection control department. 08/15/20      Electronically signed by Akash Arechiga MD, 08/15/20, 10:33 AM.      Time: I spent  25  minutes on this discharge activity which included face-to-face encounter with the patient/reviewing the data in the system/coordination of the care with the nursing staff as well as consultants/documentation/entering orders.

## 2020-08-15 NOTE — PLAN OF CARE
Problem: Patient Care Overview  Goal: Plan of Care Review  Outcome: Ongoing (interventions implemented as appropriate)  Flowsheets  Taken 8/15/2020 0236 by Alessia Ovalles, RN  Progress: improving  Outcome Summary: Pt resting comfortably in bed with no complaints of shoulder pain. Pt states she is ready to go home.  Taken 8/14/2020 0701 by Jennifer Bar, RN  Plan of Care Reviewed With: patient

## 2020-09-09 ENCOUNTER — OFFICE VISIT (OUTPATIENT)
Dept: CARDIOLOGY | Facility: CLINIC | Age: 85
End: 2020-09-09

## 2020-09-09 VITALS
HEART RATE: 71 BPM | HEIGHT: 63 IN | BODY MASS INDEX: 27.89 KG/M2 | WEIGHT: 157.4 LBS | SYSTOLIC BLOOD PRESSURE: 138 MMHG | RESPIRATION RATE: 18 BRPM | DIASTOLIC BLOOD PRESSURE: 78 MMHG

## 2020-09-09 DIAGNOSIS — I48.0 PAROXYSMAL ATRIAL FIBRILLATION (HCC): ICD-10-CM

## 2020-09-09 DIAGNOSIS — E78.2 MIXED HYPERLIPIDEMIA: ICD-10-CM

## 2020-09-09 DIAGNOSIS — R07.89 OTHER CHEST PAIN: Primary | ICD-10-CM

## 2020-09-09 DIAGNOSIS — I10 ESSENTIAL HYPERTENSION: ICD-10-CM

## 2020-09-09 PROCEDURE — 99214 OFFICE O/P EST MOD 30 MIN: CPT | Performed by: INTERNAL MEDICINE

## 2020-09-09 RX ORDER — HYDRALAZINE HYDROCHLORIDE 50 MG/1
50 TABLET, FILM COATED ORAL 2 TIMES DAILY
COMMUNITY
Start: 2020-09-02

## 2020-09-09 NOTE — PROGRESS NOTES
Subjective:     Encounter Date:09/09/2020      Patient ID: Marisol Snydre is a 84 y.o. female.    Chief Complaint:  Chief Complaint   Patient presents with   • Follow-up       HPI:  Virginia is a very pleasant 84-year-old female usually seen for new onset atrial fibrillation.    She has past medical history significant for dyslipidemia, hypertension and recent diagnosis of atrial fibrillation with rapid ventricular response leading to hospitalization 11/4/2019.  She also has a past medical history significant for gastroesophageal reflux disease.    Last visit she returned in sinus rhythm with rate and rhythm control at 66 bpm.  I personally reviewed her ECG tracing from 11/3/2019 which showed atrial fibrillation at 115 bpm with otherwise normal ECG.  I also personally reviewed her echocardiogram from 11/4/2019 which showed normal LV systolic function with moderate pulmonic regurgitation mild tricuspid and mitral regurgitation.    She has no complaints from a cardiovascular standpoint today.    The following portions of the patient's history were reviewed and updated as appropriate: allergies, current medications, past family history, past medical history, past social history, past surgical history and problem list.    Problem List:  Patient Active Problem List   Diagnosis   • Paroxysmal atrial fibrillation (CMS/[facility])   • GERD (gastroesophageal reflux disease)   • Hypertension   • Hyperlipidemia   • Chest pain   • Chronic renal insufficiency, stage III (moderate) (CMS/HCC)   • Acute pain of left shoulder       Past Medical History:  Past Medical History:   Diagnosis Date   • A-fib (CMS/HCC)    • JIGAR (acute kidney injury) (CMS/HCC)    • GERD (gastroesophageal reflux disease)    • Glaucoma    • History of echocardiogram 11/04/2019    Moderate OK, Mild MR/TR, LA moderate-severely dilated, EF 61-65%   • Hyperlipidemia    • Hypertension    • Osteoarthritis        Past Surgical History:  History reviewed. No pertinent  "surgical history.    Social History:  Social History     Socioeconomic History   • Marital status:      Spouse name: Not on file   • Number of children: Not on file   • Years of education: Not on file   • Highest education level: Not on file   Tobacco Use   • Smoking status: Never Smoker   • Smokeless tobacco: Never Used   Substance and Sexual Activity   • Alcohol use: No     Frequency: Never   • Drug use: No   • Sexual activity: Defer       Allergies:  No Known Allergies      Review of Symptoms:  Constitutional: Patient afebrile no chills or unexpected weight changes  Respiratory: No cough, no wheezing or dyspnea  Cardiovascular: Today the patient complains of no chest pain, palpitations, dyspnea, orthopnea and no edema  Gastrointestinal: No nausea, vomiting, constipation or diarrhea.  No melena or dark stools    All other systems reviewed and are negative         Objective:         /78 (BP Location: Left arm, Patient Position: Sitting)   Pulse 71   Resp 18   Ht 160 cm (63\")   Wt 71.4 kg (157 lb 6.4 oz)   BMI 27.88 kg/m²     Physical exam  Constitutional: well-nourished, and appears stated age in no acute distress  PERRL: Conjunctiva clear, no pallor, anicteric  HENMT: normocephalic, normal dentition, no cyanosis or pallor  Neck:no bruits, or thrills and bilateral normal carotid upstroke. Normal jugular venous pressure  Cardiovascular: No parasternal heaves an non-displaced focal PMI. Normal rate and rhythm: no rub, gallop, 2 out of 6 early peaking systolic ejection murmur and normal S1 and S2; no lower or upper extremity edema.   Lungs: unlabored, no wheezing with no rales or rhonchi on auscultation.  Extremities: Warm, no clubbing, cyanosis. Full and equal peripheral pulses in extremities with no bruits appreciated.   Abdomen: soft, non-tender, non-distended  Musculoskeletal: no joint tenderness or swelling and no erythema  Skin: Warm and dry, non-erythematous   Neuro:alert and normal affect. " Oriented to time, place and person.           In-Office Procedure(s):  Procedures    ASCVD RIsk Score::  The ASCVD Risk score (Vanleer JASPAL Jr., et al., 2013) failed to calculate for the following reasons:    The 2013 ASCVD risk score is only valid for ages 40 to 79    Recent Radiology:  Imaging Results (Most Recent)     None          Lab Review:   Admission on 08/13/2020, Discharged on 08/15/2020   Component Date Value   • Glucose 08/13/2020 125*   • BUN 08/13/2020     • Creatinine 08/13/2020 1.11*   • Sodium 08/13/2020 133*   • Potassium 08/13/2020 3.9    • Chloride 08/13/2020 95*   • CO2 08/13/2020 24.0    • Calcium 08/13/2020 10.5    • eGFR Non African Amer 08/13/2020 47*   • BUN/Creatinine Ratio 08/13/2020     • Anion Gap 08/13/2020 14.0    • Protime 08/13/2020 10.9    • INR 08/13/2020 1.00    • PTT 08/13/2020 32.1*   • proBNP 08/13/2020 171.4    • Troponin T 08/13/2020 <0.010    • WBC 08/13/2020 10.40    • RBC 08/13/2020 4.69    • Hemoglobin 08/13/2020 14.6    • Hematocrit 08/13/2020 42.9    • MCV 08/13/2020 91.6    • MCH 08/13/2020 31.2    • MCHC 08/13/2020 34.0    • RDW 08/13/2020 13.1    • RDW-SD 08/13/2020 42.0    • MPV 08/13/2020 7.1    • Platelets 08/13/2020 234    • Neutrophil % 08/13/2020 76.9*   • Lymphocyte % 08/13/2020 17.2*   • Monocyte % 08/13/2020 4.9*   • Eosinophil % 08/13/2020 0.4    • Basophil % 08/13/2020 0.6    • Neutrophils, Absolute 08/13/2020 8.00*   • Lymphocytes, Absolute 08/13/2020 1.80    • Monocytes, Absolute 08/13/2020 0.50    • Eosinophils, Absolute 08/13/2020 0.00    • Basophils, Absolute 08/13/2020 0.10    • nRBC 08/13/2020 0.0    • BUN 08/13/2020 14    • Troponin T 08/13/2020 <0.010    • Troponin T 08/14/2020 <0.010    • Total Cholesterol 08/13/2020 160    • Triglycerides 08/13/2020 71    • HDL Cholesterol 08/13/2020 61*   • LDL Cholesterol  08/13/2020 85    • VLDL Cholesterol 08/13/2020 14.2    • LDL/HDL Ratio 08/13/2020 1.39    • Glucose 08/14/2020 124*   • BUN 08/14/2020     •  Creatinine 08/14/2020 0.96    • Sodium 08/14/2020 133*   • Potassium 08/14/2020 3.7    • Chloride 08/14/2020 94*   • CO2 08/14/2020 27.0    • Calcium 08/14/2020 9.6    • eGFR Non African Amer 08/14/2020 55*   • BUN/Creatinine Ratio 08/14/2020     • Anion Gap 08/14/2020 12.0    • WBC 08/14/2020 9.70    • RBC 08/14/2020 4.10    • Hemoglobin 08/14/2020 13.0    • Hematocrit 08/14/2020 37.6    • MCV 08/14/2020 91.9    • MCH 08/14/2020 31.8    • MCHC 08/14/2020 34.7    • RDW 08/14/2020 13.3    • RDW-SD 08/14/2020 42.4    • MPV 08/14/2020 7.3    • Platelets 08/14/2020 212    • Neutrophil % 08/14/2020 86.3*   • Lymphocyte % 08/14/2020 9.6*   • Monocyte % 08/14/2020 3.7*   • Eosinophil % 08/14/2020 0.1*   • Basophil % 08/14/2020 0.3    • Neutrophils, Absolute 08/14/2020 8.30*   • Lymphocytes, Absolute 08/14/2020 0.90    • Monocytes, Absolute 08/14/2020 0.40    • Eosinophils, Absolute 08/14/2020 0.00    • Basophils, Absolute 08/14/2020 0.00    • nRBC 08/14/2020 0.0    • BUN 08/14/2020 15    • TSH 08/13/2020 1.600               Invalid input(s): ALKPO4                        Invalid input(s): LDLCALC                Assessment:          Diagnosis Plan   1. Other chest pain     2. Paroxysmal atrial fibrillation (CMS/HCC)     3. Essential hypertension     4. Mixed hyperlipidemia            Plan:         1. Other chest pain  Resolved    2. Paroxysmal atrial fibrillation (CMS/HCC)  Currently normal sinus rhythm.  Continue anticoagulation.    3. Essential hypertension  Well-controlled on medical therapy    4. Mixed hyperlipidemia  Well-controlled on medical therapy    5.  Systolic ejection murmur  -Innocent murmur.  Likely aortic sclerosis             Ricky York MD  09/09/20  .

## 2021-03-10 ENCOUNTER — OFFICE VISIT (OUTPATIENT)
Dept: CARDIOLOGY | Facility: CLINIC | Age: 86
End: 2021-03-10

## 2021-03-10 VITALS
HEART RATE: 66 BPM | WEIGHT: 162 LBS | HEIGHT: 63 IN | DIASTOLIC BLOOD PRESSURE: 82 MMHG | RESPIRATION RATE: 18 BRPM | SYSTOLIC BLOOD PRESSURE: 158 MMHG | BODY MASS INDEX: 28.7 KG/M2

## 2021-03-10 DIAGNOSIS — I10 ESSENTIAL HYPERTENSION: ICD-10-CM

## 2021-03-10 DIAGNOSIS — R07.89 OTHER CHEST PAIN: Primary | ICD-10-CM

## 2021-03-10 DIAGNOSIS — E78.2 MIXED HYPERLIPIDEMIA: ICD-10-CM

## 2021-03-10 DIAGNOSIS — I48.0 PAROXYSMAL ATRIAL FIBRILLATION (HCC): ICD-10-CM

## 2021-03-10 PROCEDURE — 99214 OFFICE O/P EST MOD 30 MIN: CPT | Performed by: INTERNAL MEDICINE

## 2021-03-10 RX ORDER — FAMOTIDINE 20 MG/1
20 TABLET, FILM COATED ORAL 2 TIMES DAILY
COMMUNITY
Start: 2021-02-12

## 2021-03-10 RX ORDER — METOPROLOL TARTRATE 50 MG/1
50 TABLET, FILM COATED ORAL 2 TIMES DAILY
COMMUNITY

## 2021-03-10 RX ORDER — SODIUM PHOSPHATE,MONO-DIBASIC 19G-7G/118
ENEMA (ML) RECTAL 2 TIMES DAILY WITH MEALS
COMMUNITY

## 2021-03-10 NOTE — PROGRESS NOTES
Subjective:     Encounter Date:03/10/2021      Patient ID: Marisol Snyder is a 85 y.o. female.    Chief Complaint:  Chief Complaint   Patient presents with   • Follow-up       HPI:  Virginia is a very pleasant 85-year-old female usually seen for new onset atrial fibrillation.    She has past medical history significant for dyslipidemia, hypertension and recent diagnosis of atrial fibrillation with rapid ventricular response leading to hospitalization 11/4/2019.  She also has a past medical history significant for gastroesophageal reflux disease.    She is being maintained in sinus rhythm with rate and rhythm control at 66 bpm.  I personally reviewed her ECG tracing from 11/3/2019 which showed atrial fibrillation at 115 bpm with otherwise normal ECG.  I also personally reviewed her echocardiogram from 11/4/2019 which showed normal LV systolic function with moderate pulmonic regurgitation mild tricuspid and mitral regurgitation.    He has no complaints from a cardiovascular standpoint.    The following portions of the patient's history were reviewed and updated as appropriate: allergies, current medications, past family history, past medical history, past social history, past surgical history and problem list.    Problem List:  Patient Active Problem List   Diagnosis   • Paroxysmal atrial fibrillation (CMS/[facility])   • GERD (gastroesophageal reflux disease)   • Hypertension   • Hyperlipidemia   • Chest pain   • Chronic renal insufficiency, stage III (moderate) (CMS/HCC)   • Acute pain of left shoulder       Past Medical History:  Past Medical History:   Diagnosis Date   • A-fib (CMS/HCC)    • JIGAR (acute kidney injury) (CMS/HCC)    • GERD (gastroesophageal reflux disease)    • Glaucoma    • History of echocardiogram 11/04/2019    Moderate ID, Mild MR/TR, LA moderate-severely dilated, EF 61-65%   • Hyperlipidemia    • Hypertension    • Osteoarthritis        Past Surgical History:  History reviewed. No pertinent  "surgical history.    Social History:  Social History     Socioeconomic History   • Marital status:      Spouse name: Not on file   • Number of children: Not on file   • Years of education: Not on file   • Highest education level: Not on file   Tobacco Use   • Smoking status: Never Smoker   • Smokeless tobacco: Never Used   Substance and Sexual Activity   • Alcohol use: No   • Drug use: No   • Sexual activity: Defer       Allergies:  No Known Allergies      Review of Symptoms:  Constitutional: Patient afebrile no chills or unexpected weight changes  Respiratory: No cough, no wheezing or dyspnea  Cardiovascular: Today the patient complains of no chest pain, palpitations, dyspnea, orthopnea and no edema  Gastrointestinal: No nausea, vomiting, constipation or diarrhea.  No melena or dark stools    All other systems reviewed and are negative             Objective:         /82 (BP Location: Left arm, Patient Position: Sitting)   Pulse 66   Resp 18   Ht 160 cm (63\")   Wt 73.5 kg (162 lb)   BMI 28.70 kg/m²     Physical exam  Constitutional: well-nourished, and appears stated age in no acute distress  PERRL: Conjunctiva clear, no pallor, anicteric  HENMT: normocephalic, normal dentition, no cyanosis or pallor  Neck:no bruits, or thrills and bilateral normal carotid upstroke. Normal jugular venous pressure  Cardiovascular: No parasternal heaves an non-displaced focal PMI. Normal rate and rhythm: no rub, gallop, murmur or click and normal S1 and S2; no lower or upper extremity edema.   Lungs: unlabored, no wheezing with no rales or rhonchi on auscultation.  Extremities: Warm, no clubbing, cyanosis. Full and equal peripheral pulses in extremities with no bruits appreciated.   Abdomen: soft, non-tender, non-distended  Musculoskeletal: no joint tenderness or swelling and no erythema  Skin: Warm and dry, non-erythematous   Neuro:alert and normal affect. Oriented to time, place and person.           In-Office " Procedure(s):  Procedures    ASCVD RIsk Score::  The ASCVD Risk score (Gracewoodjoni SHAY Jr., et al., 2013) failed to calculate for the following reasons:    The 2013 ASCVD risk score is only valid for ages 40 to 79    Recent Radiology:  Imaging Results (Most Recent)     None          Lab Review:   No visits with results within 2 Month(s) from this visit.   Latest known visit with results is:   Admission on 08/13/2020, Discharged on 08/15/2020   Component Date Value   • Glucose 08/13/2020 125*   • BUN 08/13/2020     • Creatinine 08/13/2020 1.11*   • Sodium 08/13/2020 133*   • Potassium 08/13/2020 3.9    • Chloride 08/13/2020 95*   • CO2 08/13/2020 24.0    • Calcium 08/13/2020 10.5    • eGFR Non African Amer 08/13/2020 47*   • BUN/Creatinine Ratio 08/13/2020     • Anion Gap 08/13/2020 14.0    • Protime 08/13/2020 10.9    • INR 08/13/2020 1.00    • PTT 08/13/2020 32.1*   • proBNP 08/13/2020 171.4    • Troponin T 08/13/2020 <0.010    • WBC 08/13/2020 10.40    • RBC 08/13/2020 4.69    • Hemoglobin 08/13/2020 14.6    • Hematocrit 08/13/2020 42.9    • MCV 08/13/2020 91.6    • MCH 08/13/2020 31.2    • MCHC 08/13/2020 34.0    • RDW 08/13/2020 13.1    • RDW-SD 08/13/2020 42.0    • MPV 08/13/2020 7.1    • Platelets 08/13/2020 234    • Neutrophil % 08/13/2020 76.9*   • Lymphocyte % 08/13/2020 17.2*   • Monocyte % 08/13/2020 4.9*   • Eosinophil % 08/13/2020 0.4    • Basophil % 08/13/2020 0.6    • Neutrophils, Absolute 08/13/2020 8.00*   • Lymphocytes, Absolute 08/13/2020 1.80    • Monocytes, Absolute 08/13/2020 0.50    • Eosinophils, Absolute 08/13/2020 0.00    • Basophils, Absolute 08/13/2020 0.10    • nRBC 08/13/2020 0.0    • BUN 08/13/2020 14    • Troponin T 08/13/2020 <0.010    • Troponin T 08/14/2020 <0.010    • Total Cholesterol 08/13/2020 160    • Triglycerides 08/13/2020 71    • HDL Cholesterol 08/13/2020 61*   • LDL Cholesterol  08/13/2020 85    • VLDL Cholesterol 08/13/2020 14.2    • LDL/HDL Ratio 08/13/2020 1.39    • Glucose  08/14/2020 124*   • BUN 08/14/2020     • Creatinine 08/14/2020 0.96    • Sodium 08/14/2020 133*   • Potassium 08/14/2020 3.7    • Chloride 08/14/2020 94*   • CO2 08/14/2020 27.0    • Calcium 08/14/2020 9.6    • eGFR Non African Amer 08/14/2020 55*   • BUN/Creatinine Ratio 08/14/2020     • Anion Gap 08/14/2020 12.0    • WBC 08/14/2020 9.70    • RBC 08/14/2020 4.10    • Hemoglobin 08/14/2020 13.0    • Hematocrit 08/14/2020 37.6    • MCV 08/14/2020 91.9    • MCH 08/14/2020 31.8    • MCHC 08/14/2020 34.7    • RDW 08/14/2020 13.3    • RDW-SD 08/14/2020 42.4    • MPV 08/14/2020 7.3    • Platelets 08/14/2020 212    • Neutrophil % 08/14/2020 86.3*   • Lymphocyte % 08/14/2020 9.6*   • Monocyte % 08/14/2020 3.7*   • Eosinophil % 08/14/2020 0.1*   • Basophil % 08/14/2020 0.3    • Neutrophils, Absolute 08/14/2020 8.30*   • Lymphocytes, Absolute 08/14/2020 0.90    • Monocytes, Absolute 08/14/2020 0.40    • Eosinophils, Absolute 08/14/2020 0.00    • Basophils, Absolute 08/14/2020 0.00    • nRBC 08/14/2020 0.0    • BUN 08/14/2020 15    • TSH 08/13/2020 1.600               Invalid input(s): ALKPO4                        Invalid input(s): LDLCALC                Assessment:          Diagnosis Plan   1. Other chest pain     2. Mixed hyperlipidemia     3. Essential hypertension     4. Paroxysmal atrial fibrillation (CMS/HCC)            Plan:         1. Other chest pain  Resolved    2. Mixed hyperlipidemia  Well managed    3. Essential hypertension  Well-controlled on medical therapy    4. Paroxysmal atrial fibrillation (CMS/HCC)  Currently sinus rhythm.  Continue Panchito York MD  03/10/21  .

## 2021-09-15 ENCOUNTER — OFFICE VISIT (OUTPATIENT)
Dept: CARDIOLOGY | Facility: CLINIC | Age: 86
End: 2021-09-15

## 2021-09-15 VITALS
OXYGEN SATURATION: 96 % | DIASTOLIC BLOOD PRESSURE: 80 MMHG | BODY MASS INDEX: 27.64 KG/M2 | HEART RATE: 60 BPM | WEIGHT: 156 LBS | SYSTOLIC BLOOD PRESSURE: 136 MMHG | RESPIRATION RATE: 18 BRPM | HEIGHT: 63 IN

## 2021-09-15 DIAGNOSIS — K21.00 GASTROESOPHAGEAL REFLUX DISEASE WITH ESOPHAGITIS WITHOUT HEMORRHAGE: ICD-10-CM

## 2021-09-15 DIAGNOSIS — E78.2 MIXED HYPERLIPIDEMIA: ICD-10-CM

## 2021-09-15 DIAGNOSIS — I10 ESSENTIAL HYPERTENSION: ICD-10-CM

## 2021-09-15 DIAGNOSIS — I48.0 PAROXYSMAL ATRIAL FIBRILLATION (HCC): Primary | ICD-10-CM

## 2021-09-15 PROCEDURE — 99214 OFFICE O/P EST MOD 30 MIN: CPT | Performed by: INTERNAL MEDICINE

## 2021-09-15 NOTE — PROGRESS NOTES
Subjective:     Encounter Date:09/15/2021      Patient ID: Marisol Snyder is a 85 y.o. female.    Chief Complaint:  Chief Complaint   Patient presents with   • Atrial Fibrillation   • Hypertension   • Hyperlipidemia       HPI:  Virginia is a very pleasant 85-year-old female usually seen for new onset atrial fibrillation.    She has past medical history significant for dyslipidemia, hypertension and recent diagnosis of atrial fibrillation with rapid ventricular response leading to hospitalization 11/4/2019.  She also has a past medical history significant for gastroesophageal reflux disease.    She is being maintained in sinus rhythm with rate and rhythm control at 66 bpm.  I personally reviewed her ECG tracing from 11/3/2019 which showed atrial fibrillation at 115 bpm with otherwise normal ECG.  I also personally reviewed her echocardiogram from 11/4/2019 which showed normal LV systolic function with moderate pulmonic regurgitation mild tricuspid and mitral regurgitation.    She returns with no complaints from a cardiovascular standpoint.    The following portions of the patient's history were reviewed and updated as appropriate: allergies, current medications, past family history, past medical history, past social history, past surgical history and problem list.    Problem List:  Patient Active Problem List   Diagnosis   • Paroxysmal atrial fibrillation (CMS/HCC)   • GERD (gastroesophageal reflux disease)   • Hypertension   • Hyperlipidemia   • Chest pain   • Chronic renal insufficiency, stage III (moderate) (CMS/HCC)   • Acute pain of left shoulder       Past Medical History:  Past Medical History:   Diagnosis Date   • A-fib (CMS/HCC)    • JIGAR (acute kidney injury) (CMS/HCC)    • GERD (gastroesophageal reflux disease)    • Glaucoma    • History of echocardiogram 11/04/2019    Moderate AL, Mild MR/TR, LA moderate-severely dilated, EF 61-65%   • Hyperlipidemia    • Hypertension    • Osteoarthritis        Past  "Surgical History:  Past Surgical History:   Procedure Laterality Date   • NO PAST SURGERIES         Social History:  Social History     Socioeconomic History   • Marital status:      Spouse name: Not on file   • Number of children: Not on file   • Years of education: Not on file   • Highest education level: Not on file   Tobacco Use   • Smoking status: Never Smoker   • Smokeless tobacco: Never Used   Substance and Sexual Activity   • Alcohol use: No   • Drug use: No   • Sexual activity: Defer       Allergies:  No Known Allergies        Review of Symptoms:  Constitutional: Patient afebrile no chills or unexpected weight changes  Respiratory: No cough, no wheezing or dyspnea  Cardiovascular: Today the patient complains of no chest pain, palpitations, dyspnea, orthopnea and no edema  Gastrointestinal: No nausea, vomiting, constipation or diarrhea.  No melena or dark stools    All other systems reviewed and are negative           Objective:         /80 (BP Location: Left arm, Patient Position: Sitting, Cuff Size: Large Adult)   Pulse 60   Resp 18   Ht 160 cm (63\")   Wt 70.8 kg (156 lb)   SpO2 96%   BMI 27.63 kg/m²       Physical exam  Constitutional: well-nourished, and appears stated age in no acute distress  PERRL: Conjunctiva clear, no pallor, anicteric  HENMT: normocephalic, normal dentition, no cyanosis or pallor  Neck:no bruits, or thrills and bilateral normal carotid upstroke. Normal jugular venous pressure  Cardiovascular: No parasternal heaves an non-displaced focal PMI. Normal rate and rhythm: no rub, gallop, murmur or click and normal S1 and S2; no lower or upper extremity edema.   Lungs: unlabored, no wheezing with no rales or rhonchi on auscultation.  Extremities: Warm, no clubbing, cyanosis. Full and equal peripheral pulses in extremities with no bruits appreciated.   Abdomen: soft, non-tender, non-distended  Musculoskeletal: no joint tenderness or swelling and no erythema  Skin: Warm " and dry, non-erythematous   Neuro:alert and normal affect. Oriented to time, place and person.       In-Office Procedure(s):  Procedures    ASCVD RIsk Score::  The ASCVD Risk score (Fiona JASPAL Jr., et al., 2013) failed to calculate for the following reasons:    The 2013 ASCVD risk score is only valid for ages 40 to 79    Recent Radiology:  Imaging Results (Most Recent)     None          Lab Review:   No visits with results within 2 Month(s) from this visit.   Latest known visit with results is:   Admission on 08/13/2020, Discharged on 08/15/2020   Component Date Value   • Glucose 08/13/2020 125*   • BUN 08/13/2020     • Creatinine 08/13/2020 1.11*   • Sodium 08/13/2020 133*   • Potassium 08/13/2020 3.9    • Chloride 08/13/2020 95*   • CO2 08/13/2020 24.0    • Calcium 08/13/2020 10.5    • eGFR Non African Amer 08/13/2020 47*   • BUN/Creatinine Ratio 08/13/2020     • Anion Gap 08/13/2020 14.0    • Protime 08/13/2020 10.9    • INR 08/13/2020 1.00    • PTT 08/13/2020 32.1*   • proBNP 08/13/2020 171.4    • Troponin T 08/13/2020 <0.010    • WBC 08/13/2020 10.40    • RBC 08/13/2020 4.69    • Hemoglobin 08/13/2020 14.6    • Hematocrit 08/13/2020 42.9    • MCV 08/13/2020 91.6    • MCH 08/13/2020 31.2    • MCHC 08/13/2020 34.0    • RDW 08/13/2020 13.1    • RDW-SD 08/13/2020 42.0    • MPV 08/13/2020 7.1    • Platelets 08/13/2020 234    • Neutrophil % 08/13/2020 76.9*   • Lymphocyte % 08/13/2020 17.2*   • Monocyte % 08/13/2020 4.9*   • Eosinophil % 08/13/2020 0.4    • Basophil % 08/13/2020 0.6    • Neutrophils, Absolute 08/13/2020 8.00*   • Lymphocytes, Absolute 08/13/2020 1.80    • Monocytes, Absolute 08/13/2020 0.50    • Eosinophils, Absolute 08/13/2020 0.00    • Basophils, Absolute 08/13/2020 0.10    • nRBC 08/13/2020 0.0    • BUN 08/13/2020 14    • Troponin T 08/13/2020 <0.010    • Troponin T 08/14/2020 <0.010    • Total Cholesterol 08/13/2020 160    • Triglycerides 08/13/2020 71    • HDL Cholesterol 08/13/2020 61*   • LDL  Cholesterol  08/13/2020 85    • VLDL Cholesterol 08/13/2020 14.2    • LDL/HDL Ratio 08/13/2020 1.39    • Glucose 08/14/2020 124*   • BUN 08/14/2020     • Creatinine 08/14/2020 0.96    • Sodium 08/14/2020 133*   • Potassium 08/14/2020 3.7    • Chloride 08/14/2020 94*   • CO2 08/14/2020 27.0    • Calcium 08/14/2020 9.6    • eGFR Non African Amer 08/14/2020 55*   • BUN/Creatinine Ratio 08/14/2020     • Anion Gap 08/14/2020 12.0    • WBC 08/14/2020 9.70    • RBC 08/14/2020 4.10    • Hemoglobin 08/14/2020 13.0    • Hematocrit 08/14/2020 37.6    • MCV 08/14/2020 91.9    • MCH 08/14/2020 31.8    • MCHC 08/14/2020 34.7    • RDW 08/14/2020 13.3    • RDW-SD 08/14/2020 42.4    • MPV 08/14/2020 7.3    • Platelets 08/14/2020 212    • Neutrophil % 08/14/2020 86.3*   • Lymphocyte % 08/14/2020 9.6*   • Monocyte % 08/14/2020 3.7*   • Eosinophil % 08/14/2020 0.1*   • Basophil % 08/14/2020 0.3    • Neutrophils, Absolute 08/14/2020 8.30*   • Lymphocytes, Absolute 08/14/2020 0.90    • Monocytes, Absolute 08/14/2020 0.40    • Eosinophils, Absolute 08/14/2020 0.00    • Basophils, Absolute 08/14/2020 0.00    • nRBC 08/14/2020 0.0    • BUN 08/14/2020 15    • TSH 08/13/2020 1.600               Invalid input(s): ALKPO4                        Invalid input(s): LDLCALC                Assessment:          Diagnosis Plan   1. Paroxysmal atrial fibrillation (CMS/HCC)     2. Essential hypertension     3. Mixed hyperlipidemia     4. Gastroesophageal reflux disease with esophagitis without hemorrhage            Plan:         1. Paroxysmal atrial fibrillation (CMS/HCC)  Continue anticoagulation.    2. Essential hypertension  Well-controlled on medical therapy    3. Mixed hyperlipidemia  Well-controlled on medical therapy currently    4. Gastroesophageal reflux disease with esophagitis without hemorrhage  Currently clinically silent.         Ricky York MD  09/15/21  .

## 2022-03-23 ENCOUNTER — OFFICE VISIT (OUTPATIENT)
Dept: CARDIOLOGY | Facility: CLINIC | Age: 87
End: 2022-03-23

## 2022-03-23 VITALS
BODY MASS INDEX: 27.82 KG/M2 | DIASTOLIC BLOOD PRESSURE: 66 MMHG | HEART RATE: 78 BPM | RESPIRATION RATE: 18 BRPM | OXYGEN SATURATION: 97 % | SYSTOLIC BLOOD PRESSURE: 106 MMHG | HEIGHT: 63 IN | WEIGHT: 157 LBS

## 2022-03-23 DIAGNOSIS — K21.00 GASTROESOPHAGEAL REFLUX DISEASE WITH ESOPHAGITIS WITHOUT HEMORRHAGE: ICD-10-CM

## 2022-03-23 DIAGNOSIS — I48.0 PAROXYSMAL ATRIAL FIBRILLATION: Primary | ICD-10-CM

## 2022-03-23 DIAGNOSIS — I10 PRIMARY HYPERTENSION: ICD-10-CM

## 2022-03-23 DIAGNOSIS — E78.2 MIXED HYPERLIPIDEMIA: ICD-10-CM

## 2022-03-23 PROCEDURE — 99214 OFFICE O/P EST MOD 30 MIN: CPT | Performed by: INTERNAL MEDICINE

## 2022-03-23 NOTE — PROGRESS NOTES
Subjective:     Encounter Date:03/23/2022      Patient ID: Marisol Snyder is a 86 y.o. female.    Chief Complaint:  No chief complaint on file.      HPI:  Virginia is a very pleasant 86-year-old female usually seen for new onset atrial fibrillation.    She has past medical history significant for dyslipidemia, hypertension and recent diagnosis of atrial fibrillation with rapid ventricular response leading to hospitalization 11/4/2019.  She also has a past medical history significant for gastroesophageal reflux disease.    She is being maintained in sinus rhythm with rate and rhythm control at 66 bpm.  I personally reviewed her ECG tracing from 11/3/2019 which showed atrial fibrillation at 115 bpm with otherwise normal ECG.  I also personally reviewed her echocardiogram from 11/4/2019 which showed normal LV systolic function with moderate pulmonic regurgitation mild tricuspid and mitral regurgitation.    She has no complaints from a cardiovascular standpoint.    The following portions of the patient's history were reviewed and updated as appropriate: allergies, current medications, past family history, past medical history, past social history, past surgical history and problem list.    Problem List:  Patient Active Problem List   Diagnosis   • Paroxysmal atrial fibrillation ([facility])   • GERD (gastroesophageal reflux disease)   • Hypertension   • Hyperlipidemia   • Chest pain   • Chronic renal insufficiency, stage III (moderate) ([facility])   • Acute pain of left shoulder       Past Medical History:  Past Medical History:   Diagnosis Date   • A-fib (CMS/[facility])    • JIGAR (acute kidney injury) (CMS/[facility])    • GERD (gastroesophageal reflux disease)    • Glaucoma    • History of echocardiogram 11/04/2019    Moderate IL, Mild MR/TR, LA moderate-severely dilated, EF 61-65%   • Hyperlipidemia    • Hypertension    • Osteoarthritis        Past Surgical History:  Past Surgical History:   Procedure Laterality Date   • NO PAST  SURGERIES         Social History:  Social History     Socioeconomic History   • Marital status:    Tobacco Use   • Smoking status: Never Smoker   • Smokeless tobacco: Never Used   Substance and Sexual Activity   • Alcohol use: No   • Drug use: No   • Sexual activity: Defer       Allergies:  No Known Allergies        Review of Symptoms:  Constitutional: Patient afebrile no chills or unexpected weight changes  Respiratory: No cough, no wheezing or dyspnea  Cardiovascular: Today the patient complains of no chest pain, palpitations, dyspnea, orthopnea and no edema  Gastrointestinal: No nausea, vomiting, constipation or diarrhea.  No melena or dark stools    All other systems reviewed and are negative           Objective:         There were no vitals taken for this visit.      Physical exam  Constitutional: well-nourished, and appears stated age in no acute distress  PERRL: Conjunctiva clear, no pallor, anicteric  HENMT: normocephalic, normal dentition, no cyanosis or pallor  Neck:no bruits, or thrills and bilateral normal carotid upstroke. Normal jugular venous pressure  Cardiovascular: No parasternal heaves an non-displaced focal PMI. Normal rate and rhythm: no rub, gallop, murmur or click and normal S1 and S2; no lower or upper extremity edema.   Lungs: unlabored, no wheezing with no rales or rhonchi on auscultation.  Extremities: Warm, no clubbing, cyanosis. Full and equal peripheral pulses in extremities with no bruits appreciated.   Abdomen: soft, non-tender, non-distended  Musculoskeletal: no joint tenderness or swelling and no erythema  Skin: Warm and dry, non-erythematous   Neuro:alert and normal affect. Oriented to time, place and person.       In-Office Procedure(s):  Procedures    ASCVD RIsk Score::  The ASCVD Risk score (Tebbetts DC Jr., et al., 2013) failed to calculate for the following reasons:    The 2013 ASCVD risk score is only valid for ages 40 to 79    Recent Radiology:  Imaging Results (Most  Recent)     None          Lab Review:   No visits with results within 2 Month(s) from this visit.   Latest known visit with results is:   Admission on 08/13/2020, Discharged on 08/15/2020   Component Date Value   • Glucose 08/13/2020 125 (A)   • BUN 08/13/2020     • Creatinine 08/13/2020 1.11 (A)   • Sodium 08/13/2020 133 (A)   • Potassium 08/13/2020 3.9    • Chloride 08/13/2020 95 (A)   • CO2 08/13/2020 24.0    • Calcium 08/13/2020 10.5    • eGFR Non African Amer 08/13/2020 47 (A)   • BUN/Creatinine Ratio 08/13/2020     • Anion Gap 08/13/2020 14.0    • Protime 08/13/2020 10.9    • INR 08/13/2020 1.00    • PTT 08/13/2020 32.1 (A)   • proBNP 08/13/2020 171.4    • Troponin T 08/13/2020 <0.010    • WBC 08/13/2020 10.40    • RBC 08/13/2020 4.69    • Hemoglobin 08/13/2020 14.6    • Hematocrit 08/13/2020 42.9    • MCV 08/13/2020 91.6    • MCH 08/13/2020 31.2    • MCHC 08/13/2020 34.0    • RDW 08/13/2020 13.1    • RDW-SD 08/13/2020 42.0    • MPV 08/13/2020 7.1    • Platelets 08/13/2020 234    • Neutrophil % 08/13/2020 76.9 (A)   • Lymphocyte % 08/13/2020 17.2 (A)   • Monocyte % 08/13/2020 4.9 (A)   • Eosinophil % 08/13/2020 0.4    • Basophil % 08/13/2020 0.6    • Neutrophils, Absolute 08/13/2020 8.00 (A)   • Lymphocytes, Absolute 08/13/2020 1.80    • Monocytes, Absolute 08/13/2020 0.50    • Eosinophils, Absolute 08/13/2020 0.00    • Basophils, Absolute 08/13/2020 0.10    • nRBC 08/13/2020 0.0    • BUN 08/13/2020 14    • Troponin T 08/13/2020 <0.010    • Troponin T 08/14/2020 <0.010    • Total Cholesterol 08/13/2020 160    • Triglycerides 08/13/2020 71    • HDL Cholesterol 08/13/2020 61 (A)   • LDL Cholesterol  08/13/2020 85    • VLDL Cholesterol 08/13/2020 14.2    • LDL/HDL Ratio 08/13/2020 1.39    • Glucose 08/14/2020 124 (A)   • BUN 08/14/2020     • Creatinine 08/14/2020 0.96    • Sodium 08/14/2020 133 (A)   • Potassium 08/14/2020 3.7    • Chloride 08/14/2020 94 (A)   • CO2 08/14/2020 27.0    • Calcium 08/14/2020 9.6     • eGFR Non  Amer 08/14/2020 55 (A)   • BUN/Creatinine Ratio 08/14/2020     • Anion Gap 08/14/2020 12.0    • WBC 08/14/2020 9.70    • RBC 08/14/2020 4.10    • Hemoglobin 08/14/2020 13.0    • Hematocrit 08/14/2020 37.6    • MCV 08/14/2020 91.9    • MCH 08/14/2020 31.8    • MCHC 08/14/2020 34.7    • RDW 08/14/2020 13.3    • RDW-SD 08/14/2020 42.4    • MPV 08/14/2020 7.3    • Platelets 08/14/2020 212    • Neutrophil % 08/14/2020 86.3 (A)   • Lymphocyte % 08/14/2020 9.6 (A)   • Monocyte % 08/14/2020 3.7 (A)   • Eosinophil % 08/14/2020 0.1 (A)   • Basophil % 08/14/2020 0.3    • Neutrophils, Absolute 08/14/2020 8.30 (A)   • Lymphocytes, Absolute 08/14/2020 0.90    • Monocytes, Absolute 08/14/2020 0.40    • Eosinophils, Absolute 08/14/2020 0.00    • Basophils, Absolute 08/14/2020 0.00    • nRBC 08/14/2020 0.0    • BUN 08/14/2020 15    • TSH 08/13/2020 1.600               Invalid input(s): ALKPO4                        Invalid input(s): LDLCALC                Assessment:          Diagnosis Plan   1. Paroxysmal atrial fibrillation (HCC)     2. Primary hypertension     3. Mixed hyperlipidemia     4. Gastroesophageal reflux disease with esophagitis without hemorrhage            Plan:         1. Paroxysmal atrial fibrillation (HCC)  Continue anticoagulation.  Now chronic.  Well rate controlled.    2. Primary hypertension  Stable on medical therapy    3. Mixed hyperlipidemia  Stable    4. Gastroesophageal reflux disease with esophagitis without hemorrhage  Asymptomatic       Ricky York MD  03/23/22  .

## 2022-08-03 ENCOUNTER — HOSPITAL ENCOUNTER (EMERGENCY)
Facility: HOSPITAL | Age: 87
Discharge: HOME | End: 2022-08-03
Attending: EMERGENCY MEDICINE | Admitting: EMERGENCY MEDICINE

## 2022-08-03 VITALS
TEMPERATURE: 98 F | DIASTOLIC BLOOD PRESSURE: 92 MMHG | HEART RATE: 61 BPM | HEIGHT: 63 IN | SYSTOLIC BLOOD PRESSURE: 110 MMHG | WEIGHT: 150 LBS | RESPIRATION RATE: 18 BRPM | OXYGEN SATURATION: 91 % | BODY MASS INDEX: 26.58 KG/M2

## 2022-08-03 DIAGNOSIS — M79.10 MYALGIA: ICD-10-CM

## 2022-08-03 DIAGNOSIS — R53.1 WEAKNESS: Primary | ICD-10-CM

## 2022-08-03 LAB
ANION GAP SERPL CALCULATED.3IONS-SCNC: 12 MMOL/L (ref 5–15)
BASOPHILS # BLD AUTO: 0 10*3/MM3 (ref 0–0.2)
BASOPHILS NFR BLD AUTO: 0.4 % (ref 0–1.5)
BILIRUB UR QL STRIP: NEGATIVE
BUN SERPL-MCNC: 12 MG/DL (ref 8–23)
BUN/CREAT SERPL: 15 (ref 7–25)
CALCIUM SPEC-SCNC: 9.9 MG/DL (ref 8.6–10.5)
CHLORIDE SERPL-SCNC: 93 MMOL/L (ref 98–107)
CLARITY UR: CLEAR
CO2 SERPL-SCNC: 30 MMOL/L (ref 22–29)
COLOR UR: YELLOW
CREAT SERPL-MCNC: 0.8 MG/DL (ref 0.57–1)
DEPRECATED RDW RBC AUTO: 43.3 FL (ref 37–54)
EGFRCR SERPLBLD CKD-EPI 2021: 71.9 ML/MIN/1.73
EOSINOPHIL # BLD AUTO: 0 10*3/MM3 (ref 0–0.4)
EOSINOPHIL NFR BLD AUTO: 0 % (ref 0.3–6.2)
ERYTHROCYTE [DISTWIDTH] IN BLOOD BY AUTOMATED COUNT: 13.4 % (ref 12.3–15.4)
GLUCOSE SERPL-MCNC: 129 MG/DL (ref 65–99)
GLUCOSE UR STRIP-MCNC: NEGATIVE MG/DL
HCT VFR BLD AUTO: 46.9 % (ref 34–46.6)
HGB BLD-MCNC: 15.8 G/DL (ref 12–15.9)
HGB UR QL STRIP.AUTO: NEGATIVE
KETONES UR QL STRIP: ABNORMAL
LEUKOCYTE ESTERASE UR QL STRIP.AUTO: NEGATIVE
LYMPHOCYTES # BLD AUTO: 1.4 10*3/MM3 (ref 0.7–3.1)
LYMPHOCYTES NFR BLD AUTO: 12.1 % (ref 19.6–45.3)
MCH RBC QN AUTO: 32 PG (ref 26.6–33)
MCHC RBC AUTO-ENTMCNC: 33.6 G/DL (ref 31.5–35.7)
MCV RBC AUTO: 95.2 FL (ref 79–97)
MONOCYTES # BLD AUTO: 0.9 10*3/MM3 (ref 0.1–0.9)
MONOCYTES NFR BLD AUTO: 8.3 % (ref 5–12)
NEUTROPHILS NFR BLD AUTO: 79.2 % (ref 42.7–76)
NEUTROPHILS NFR BLD AUTO: 8.9 10*3/MM3 (ref 1.7–7)
NITRITE UR QL STRIP: NEGATIVE
NRBC BLD AUTO-RTO: 0.1 /100 WBC (ref 0–0.2)
PH UR STRIP.AUTO: 8 [PH] (ref 5–8)
PLATELET # BLD AUTO: 213 10*3/MM3 (ref 140–450)
PMV BLD AUTO: 7.2 FL (ref 6–12)
POTASSIUM SERPL-SCNC: 3 MMOL/L (ref 3.5–5.2)
PROT UR QL STRIP: ABNORMAL
RBC # BLD AUTO: 4.93 10*6/MM3 (ref 3.77–5.28)
SARS-COV-2 RNA PNL SPEC NAA+PROBE: NOT DETECTED
SODIUM SERPL-SCNC: 135 MMOL/L (ref 136–145)
SP GR UR STRIP: 1.01 (ref 1–1.03)
UROBILINOGEN UR QL STRIP: ABNORMAL
WBC NRBC COR # BLD: 11.3 10*3/MM3 (ref 3.4–10.8)

## 2022-08-03 PROCEDURE — 80048 BASIC METABOLIC PNL TOTAL CA: CPT | Performed by: EMERGENCY MEDICINE

## 2022-08-03 PROCEDURE — 85025 COMPLETE CBC W/AUTO DIFF WBC: CPT | Performed by: EMERGENCY MEDICINE

## 2022-08-03 PROCEDURE — P9612 CATHETERIZE FOR URINE SPEC: HCPCS

## 2022-08-03 PROCEDURE — 81003 URINALYSIS AUTO W/O SCOPE: CPT | Performed by: EMERGENCY MEDICINE

## 2022-08-03 PROCEDURE — 87635 SARS-COV-2 COVID-19 AMP PRB: CPT | Performed by: EMERGENCY MEDICINE

## 2022-08-03 PROCEDURE — 99283 EMERGENCY DEPT VISIT LOW MDM: CPT

## 2022-08-03 RX ORDER — SODIUM CHLORIDE 0.9 % (FLUSH) 0.9 %
10 SYRINGE (ML) INJECTION AS NEEDED
Status: DISCONTINUED | OUTPATIENT
Start: 2022-08-03 | End: 2022-08-03 | Stop reason: HOSPADM

## 2022-08-03 NOTE — ED PROVIDER NOTES
Subjective   Patient is AN 86-year-old female complaint of generalized weakness and achiness throughout the day today.  She states she has poor sense of taste today.  Denies cough fever Adrian diarrhea or other complaint.          Review of Systems   Constitutional: Negative for chills and fever.   HENT: Negative for congestion and sore throat.    Eyes: Negative for visual disturbance.   Respiratory: Negative for cough and shortness of breath.    Cardiovascular: Negative for chest pain.   Gastrointestinal: Negative for abdominal pain, diarrhea and vomiting.   Endocrine: Negative for polyuria.   Genitourinary: Negative for dysuria and flank pain.   Musculoskeletal: Negative for back pain.   Skin: Negative for pallor.   Neurological: Positive for dizziness and weakness.   Psychiatric/Behavioral: Negative for confusion.   A complete review of system was obtained and is otherwise negative    Past Medical History:   Diagnosis Date   • A-fib (CMS/[facility])    • JIGAR (acute kidney injury) (CMS/[facility])    • GERD (gastroesophageal reflux disease)    • Glaucoma    • History of echocardiogram 11/04/2019    Moderate NC, Mild MR/TR, LA moderate-severely dilated, EF 61-65%   • Hyperlipidemia    • Hypertension    • Osteoarthritis        No Known Allergies    Past Surgical History:   Procedure Laterality Date   • NO PAST SURGERIES         Family History   Problem Relation Age of Onset   • No Known Problems Mother    • No Known Problems Father    • No Known Problems Brother        Social History     Socioeconomic History   • Marital status:    Tobacco Use   • Smoking status: Never Smoker   • Smokeless tobacco: Never Used   Substance and Sexual Activity   • Alcohol use: No   • Drug use: No   • Sexual activity: Defer           Objective   Physical Exam  HEENT exam shows TMs to be clear.  Oropharynx clear moist.  Sclera is nonicteric.  Neck has no adenopathy JVD or bruits.  Lungs are clear.  Heart has a regular rate rhythm without murmur  rub or gallop.  Chest is nontender.  Abdomen is soft nontender.  Patient has normal bowel sounds without rebound or guarding.  Back has no CVA tenderness.  Extremity exam is no cyanosis or edema.  Procedures           ED Course      Results for orders placed or performed during the hospital encounter of 08/03/22   COVID-19,CEPHEID/FAINA,COR/BHAVNA/PAD/HAWK IN-HOUSE(OR EMERGENT/ADD-ON),NP SWAB IN TRANSPORT MEDIA 3-4 HR TAT, RT-PCR - Swab, Nasopharynx    Specimen: Nasopharynx; Swab   Result Value Ref Range    COVID19 Not Detected Not Detected - Ref. Range   Basic Metabolic Panel    Specimen: Blood   Result Value Ref Range    Glucose 129 (H) 65 - 99 mg/dL    BUN 12 8 - 23 mg/dL    Creatinine 0.80 0.57 - 1.00 mg/dL    Sodium 135 (L) 136 - 145 mmol/L    Potassium 3.0 (L) 3.5 - 5.2 mmol/L    Chloride 93 (L) 98 - 107 mmol/L    CO2 30.0 (H) 22.0 - 29.0 mmol/L    Calcium 9.9 8.6 - 10.5 mg/dL    BUN/Creatinine Ratio 15.0 7.0 - 25.0    Anion Gap 12.0 5.0 - 15.0 mmol/L    eGFR 71.9 >60.0 mL/min/1.73   Urinalysis With Microscopic If Indicated (No Culture) - Urine, Catheter    Specimen: Urine, Catheter   Result Value Ref Range    Color, UA Yellow Yellow, Straw    Appearance, UA Clear Clear    pH, UA 8.0 5.0 - 8.0    Specific Gravity, UA 1.014 1.005 - 1.030    Glucose, UA Negative Negative    Ketones, UA 15 mg/dL (1+) (A) Negative    Bilirubin, UA Negative Negative    Blood, UA Negative Negative    Protein, UA Trace (A) Negative    Leuk Esterase, UA Negative Negative    Nitrite, UA Negative Negative    Urobilinogen, UA 0.2 E.U./dL 0.2 - 1.0 E.U./dL   CBC Auto Differential    Specimen: Blood   Result Value Ref Range    WBC 11.30 (H) 3.40 - 10.80 10*3/mm3    RBC 4.93 3.77 - 5.28 10*6/mm3    Hemoglobin 15.8 12.0 - 15.9 g/dL    Hematocrit 46.9 (H) 34.0 - 46.6 %    MCV 95.2 79.0 - 97.0 fL    MCH 32.0 26.6 - 33.0 pg    MCHC 33.6 31.5 - 35.7 g/dL    RDW 13.4 12.3 - 15.4 %    RDW-SD 43.3 37.0 - 54.0 fl    MPV 7.2 6.0 - 12.0 fL    Platelets 213  140 - 450 10*3/mm3    Neutrophil % 79.2 (H) 42.7 - 76.0 %    Lymphocyte % 12.1 (L) 19.6 - 45.3 %    Monocyte % 8.3 5.0 - 12.0 %    Eosinophil % 0.0 (L) 0.3 - 6.2 %    Basophil % 0.4 0.0 - 1.5 %    Neutrophils, Absolute 8.90 (H) 1.70 - 7.00 10*3/mm3    Lymphocytes, Absolute 1.40 0.70 - 3.10 10*3/mm3    Monocytes, Absolute 0.90 0.10 - 0.90 10*3/mm3    Eosinophils, Absolute 0.00 0.00 - 0.40 10*3/mm3    Basophils, Absolute 0.00 0.00 - 0.20 10*3/mm3    nRBC 0.1 0.0 - 0.2 /100 WBC     No radiology results for the last day                                       MDM  Number of Diagnoses or Management Options  Diagnosis management comments: Patient has a benign physical exam with normal vital signs.  She has no evidence of acute metabolic abnormality included renal insufficiency or electrolyte abnormality there is no evidence acute infectious process.  Patient may have a viral syndrome.  Patient is COVID-negative.  She will be discharged.  She will use Tylenol ibuprofen as needed see MD for recheck.       Amount and/or Complexity of Data Reviewed  Clinical lab tests: reviewed    Risk of Complications, Morbidity, and/or Mortality  Presenting problems: high  Diagnostic procedures: high  Management options: high    Patient Progress  Patient progress: stable      Final diagnoses:   Weakness   Myalgia       ED Disposition  ED Disposition     ED Disposition   Discharge    Condition   Stable    Comment   --             No follow-up provider specified.       Medication List      No changes were made to your prescriptions during this visit.          Willis Rodriguez MD  08/03/22 [id number]

## 2022-08-03 NOTE — ED NOTES
Pt placed in bed with siderails up x 2 with pt's permission. Non-skid socks placed on pt. Call light in hand. Family at bedside.

## 2022-11-30 ENCOUNTER — LAB (OUTPATIENT)
Dept: LAB | Facility: HOSPITAL | Age: 87
End: 2022-11-30

## 2022-11-30 ENCOUNTER — TRANSCRIBE ORDERS (OUTPATIENT)
Dept: ADMINISTRATIVE | Facility: HOSPITAL | Age: 87
End: 2022-11-30

## 2022-11-30 DIAGNOSIS — E87.6 HYPOKALEMIA: ICD-10-CM

## 2022-11-30 DIAGNOSIS — E87.6 HYPOKALEMIA: Primary | ICD-10-CM

## 2022-11-30 LAB
ALBUMIN SERPL-MCNC: 4.8 G/DL (ref 3.5–5.2)
ALBUMIN/GLOB SERPL: 1.8 G/DL
ALP SERPL-CCNC: 87 U/L (ref 39–117)
ALT SERPL W P-5'-P-CCNC: 30 U/L (ref 1–33)
ANION GAP SERPL CALCULATED.3IONS-SCNC: 10.1 MMOL/L (ref 5–15)
AST SERPL-CCNC: 34 U/L (ref 1–32)
BILIRUB SERPL-MCNC: 0.5 MG/DL (ref 0–1.2)
BUN SERPL-MCNC: 12 MG/DL (ref 8–23)
BUN/CREAT SERPL: 13.2 (ref 7–25)
CALCIUM SPEC-SCNC: 10.4 MG/DL (ref 8.6–10.5)
CHLORIDE SERPL-SCNC: 98 MMOL/L (ref 98–107)
CO2 SERPL-SCNC: 29.9 MMOL/L (ref 22–29)
CREAT SERPL-MCNC: 0.91 MG/DL (ref 0.57–1)
EGFRCR SERPLBLD CKD-EPI 2021: 61.2 ML/MIN/1.73
GLOBULIN UR ELPH-MCNC: 2.6 GM/DL
GLUCOSE SERPL-MCNC: 89 MG/DL (ref 65–99)
POTASSIUM SERPL-SCNC: 3.8 MMOL/L (ref 3.5–5.2)
PROT SERPL-MCNC: 7.4 G/DL (ref 6–8.5)
SODIUM SERPL-SCNC: 138 MMOL/L (ref 136–145)

## 2022-11-30 PROCEDURE — 80053 COMPREHEN METABOLIC PANEL: CPT

## 2022-11-30 PROCEDURE — 36415 COLL VENOUS BLD VENIPUNCTURE: CPT
